# Patient Record
Sex: FEMALE | Race: BLACK OR AFRICAN AMERICAN
[De-identification: names, ages, dates, MRNs, and addresses within clinical notes are randomized per-mention and may not be internally consistent; named-entity substitution may affect disease eponyms.]

---

## 2018-04-25 ENCOUNTER — RESULT REVIEW (OUTPATIENT)
Age: 33
End: 2018-04-25

## 2018-11-14 ENCOUNTER — EMERGENCY (EMERGENCY)
Facility: HOSPITAL | Age: 33
LOS: 1 days | Discharge: HOME | End: 2018-11-14
Attending: EMERGENCY MEDICINE | Admitting: EMERGENCY MEDICINE
Payer: SELF-PAY

## 2018-11-14 VITALS
SYSTOLIC BLOOD PRESSURE: 132 MMHG | OXYGEN SATURATION: 99 % | DIASTOLIC BLOOD PRESSURE: 83 MMHG | HEART RATE: 81 BPM | RESPIRATION RATE: 16 BRPM | TEMPERATURE: 99 F

## 2018-11-14 VITALS
SYSTOLIC BLOOD PRESSURE: 128 MMHG | DIASTOLIC BLOOD PRESSURE: 73 MMHG | TEMPERATURE: 99 F | RESPIRATION RATE: 18 BRPM | HEART RATE: 95 BPM | OXYGEN SATURATION: 100 %

## 2018-11-14 DIAGNOSIS — Z98.890 OTHER SPECIFIED POSTPROCEDURAL STATES: Chronic | ICD-10-CM

## 2018-11-14 LAB
ALBUMIN SERPL ELPH-MCNC: 4.6 G/DL — SIGNIFICANT CHANGE UP (ref 3.3–5)
ALP SERPL-CCNC: 70 U/L — SIGNIFICANT CHANGE UP (ref 40–120)
ALT FLD-CCNC: 13 U/L — SIGNIFICANT CHANGE UP (ref 4–33)
APTT BLD: 31.7 SEC — SIGNIFICANT CHANGE UP (ref 27.5–36.3)
AST SERPL-CCNC: 17 U/L — SIGNIFICANT CHANGE UP (ref 4–32)
BASOPHILS # BLD AUTO: 0.01 K/UL — SIGNIFICANT CHANGE UP (ref 0–0.2)
BASOPHILS NFR BLD AUTO: 0.2 % — SIGNIFICANT CHANGE UP (ref 0–2)
BILIRUB SERPL-MCNC: 0.4 MG/DL — SIGNIFICANT CHANGE UP (ref 0.2–1.2)
BLD GP AB SCN SERPL QL: NEGATIVE — SIGNIFICANT CHANGE UP
BUN SERPL-MCNC: 12 MG/DL — SIGNIFICANT CHANGE UP (ref 7–23)
CALCIUM SERPL-MCNC: 9.8 MG/DL — SIGNIFICANT CHANGE UP (ref 8.4–10.5)
CHLORIDE SERPL-SCNC: 102 MMOL/L — SIGNIFICANT CHANGE UP (ref 98–107)
CO2 SERPL-SCNC: 25 MMOL/L — SIGNIFICANT CHANGE UP (ref 22–31)
CREAT SERPL-MCNC: 0.84 MG/DL — SIGNIFICANT CHANGE UP (ref 0.5–1.3)
EOSINOPHIL # BLD AUTO: 0.12 K/UL — SIGNIFICANT CHANGE UP (ref 0–0.5)
EOSINOPHIL NFR BLD AUTO: 2.1 % — SIGNIFICANT CHANGE UP (ref 0–6)
GLUCOSE SERPL-MCNC: 93 MG/DL — SIGNIFICANT CHANGE UP (ref 70–99)
HCG SERPL-ACNC: 245.8 MIU/ML — SIGNIFICANT CHANGE UP
HCT VFR BLD CALC: 41.1 % — SIGNIFICANT CHANGE UP (ref 34.5–45)
HGB BLD-MCNC: 13.7 G/DL — SIGNIFICANT CHANGE UP (ref 11.5–15.5)
IMM GRANULOCYTES # BLD AUTO: 0.01 # — SIGNIFICANT CHANGE UP
IMM GRANULOCYTES NFR BLD AUTO: 0.2 % — SIGNIFICANT CHANGE UP (ref 0–1.5)
INR BLD: 1 — SIGNIFICANT CHANGE UP (ref 0.88–1.17)
LYMPHOCYTES # BLD AUTO: 3.09 K/UL — SIGNIFICANT CHANGE UP (ref 1–3.3)
LYMPHOCYTES # BLD AUTO: 54.1 % — HIGH (ref 13–44)
MCHC RBC-ENTMCNC: 29.2 PG — SIGNIFICANT CHANGE UP (ref 27–34)
MCHC RBC-ENTMCNC: 33.3 % — SIGNIFICANT CHANGE UP (ref 32–36)
MCV RBC AUTO: 87.6 FL — SIGNIFICANT CHANGE UP (ref 80–100)
MONOCYTES # BLD AUTO: 0.43 K/UL — SIGNIFICANT CHANGE UP (ref 0–0.9)
MONOCYTES NFR BLD AUTO: 7.5 % — SIGNIFICANT CHANGE UP (ref 2–14)
NEUTROPHILS # BLD AUTO: 2.05 K/UL — SIGNIFICANT CHANGE UP (ref 1.8–7.4)
NEUTROPHILS NFR BLD AUTO: 35.9 % — LOW (ref 43–77)
NRBC # FLD: 0 — SIGNIFICANT CHANGE UP
PLATELET # BLD AUTO: 435 K/UL — HIGH (ref 150–400)
PMV BLD: 11.1 FL — SIGNIFICANT CHANGE UP (ref 7–13)
POTASSIUM SERPL-MCNC: 3.6 MMOL/L — SIGNIFICANT CHANGE UP (ref 3.5–5.3)
POTASSIUM SERPL-SCNC: 3.6 MMOL/L — SIGNIFICANT CHANGE UP (ref 3.5–5.3)
PROT SERPL-MCNC: 8.2 G/DL — SIGNIFICANT CHANGE UP (ref 6–8.3)
PROTHROM AB SERPL-ACNC: 11.1 SEC — SIGNIFICANT CHANGE UP (ref 9.8–13.1)
RBC # BLD: 4.69 M/UL — SIGNIFICANT CHANGE UP (ref 3.8–5.2)
RBC # FLD: 13.2 % — SIGNIFICANT CHANGE UP (ref 10.3–14.5)
RH IG SCN BLD-IMP: POSITIVE — SIGNIFICANT CHANGE UP
RH IG SCN BLD-IMP: POSITIVE — SIGNIFICANT CHANGE UP
SODIUM SERPL-SCNC: 141 MMOL/L — SIGNIFICANT CHANGE UP (ref 135–145)
WBC # BLD: 5.71 K/UL — SIGNIFICANT CHANGE UP (ref 3.8–10.5)
WBC # FLD AUTO: 5.71 K/UL — SIGNIFICANT CHANGE UP (ref 3.8–10.5)

## 2018-11-14 PROCEDURE — 76830 TRANSVAGINAL US NON-OB: CPT | Mod: 26

## 2018-11-14 PROCEDURE — 99284 EMERGENCY DEPT VISIT MOD MDM: CPT | Mod: 25

## 2018-11-14 PROCEDURE — 99283 EMERGENCY DEPT VISIT LOW MDM: CPT

## 2018-11-14 PROCEDURE — 76817 TRANSVAGINAL US OBSTETRIC: CPT | Mod: 26

## 2018-11-14 RX ORDER — METHOTREXATE 2.5 MG/1
96.5 TABLET ORAL ONCE
Refills: 0 | Status: COMPLETED | OUTPATIENT
Start: 2018-11-14 | End: 2018-11-14

## 2018-11-14 NOTE — ED PROVIDER NOTE - PROGRESS NOTE DETAILS
Kasia: Pt received as signout. Pending US and OB decision regarding whether to operate or administer mtx. Per radiology ectopic is actually on left side. OB paged to clarify. Kasia: GYN saw pt. They recommend mtx and ordered it. Pt now awaiting pharmacy to send mtx. Pt will then go home with GYN followup. Kasia: pt given mtx. Requesting to go home. Feels better.

## 2018-11-14 NOTE — ED ADULT NURSE NOTE - OBJECTIVE STATEMENT
Pt A&Ox3. 7 weeks pregnant, sent in by OB-GYN for confirmed known left ectopic pregnancy. Pt complaining of intermittent LLQ pain x5days. Pt reports spotting when wiping. No active vaginal bleeding noted at this time. Respirations equal nonlabored, no sign of respiratory distress. Abdomen soft, nondistended, tender to touch in left lower quadrant. Breath sounds clear bilaterally. Denies chest pain, SOB, N/V/D, dizziness, fevers. Last LMP 09/25/18.  at bedside, safety maintained will reassess

## 2018-11-14 NOTE — ED PROVIDER NOTE - CARE PLAN
Principal Discharge DX:	Ectopic pregnancy  Assessment and plan of treatment:	The patient was given verbal and written discharge instructions. Specifically, instructions when to return to the ED and when to seek follow-up from their pcp was discussed. Any specialty follow-up was discussed, including how to make an appointment.  Instructions were discussed in simple, plain language and was understood by the patient. The patient understands that should their symptoms worsen or any new symptoms arise, they should return to the ED immediately for further evaluation.

## 2018-11-14 NOTE — CONSULT NOTE ADULT - ATTENDING COMMENTS
Agree with above  Pt seen and examined  Pt with no complaints  VSS, afebrile    PE -   Ab - soft/nt/nd/bs+/no rebound or guarding    BHCG 245 today and was 261 last week on Wed at [facility] in [place] (pt has her paperwork)    Sono with possible left ecoptic pregnancy 2 cm/no FF    A/P Pt with abnormal pregnancy and possible ectopic.  Pt in stable condition.    Will  1) MTX  2) PMD Gyn notified (Gamal) by myself  3) Pt to F/U with Dr. Yeung for BHCG on Day 4 and 7    VICKI COLON THIS IS A LATE ENTRY NOTE

## 2018-11-14 NOTE — ED PROCEDURE NOTE - PROCEDURE ADDITIONAL DETAILS
Uterus: Is homogenous in appearnace with a nabothian cyst. No IUP.    Endometrium: 5 mm.     Right ovary: 2.34 cm x 2.41 cm.  There is a 1.62 cm x 1.43 cm echogenic structure adjacent to the ovary.     Left ovary: 2.63 cm x 3.44 cm.  Left simple ovarian cyst measuring 2cm.     Fluid: None.    IMPRESSION:    No IUP visualized. Findings represent pregnancy   of unknown location and viability. Rounded echogenic 1.62 cm x 1.43 cm structure adjacent to   the right ovary concerned for ectopic pregnancy.

## 2018-11-14 NOTE — ED ADULT NURSE NOTE - NSIMPLEMENTINTERV_GEN_ALL_ED
Implemented All Universal Safety Interventions:  [place] to call system. Call bell, personal items and telephone within reach. Instruct patient to call for assistance. Room bathroom lighting operational. Non-slip footwear when patient is off stretcher. Physically safe environment: no spills, clutter or unnecessary equipment. Stretcher in lowest position, wheels locked, appropriate side rails in place.

## 2018-11-14 NOTE — ED PROVIDER NOTE - OBJECTIVE STATEMENT
33F  at 7 weeks gestation sent by OBGYN for ectopic pregnancy to L ovary diagnosed in office today after presenting to office for L adnexal pain. LMP 18. Had 1 chemical pregnancy, 2 D&Cs for fetal demise. Pain currently not present, comes in waves. No chest pain/SOB/syncope/lightheadedness. Last week had some vaginal bleeding but resolved.   Last PO intake 1 hour ago (bob).    OBGYN: Dr. Elliott (works with Dr. Alvina Yeung) 33F  at 7 weeks gestation sent by OBGYN for ectopic pregnancy to R ovary diagnosed in office today after presenting to office for R adnexal pain. LMP 18. Had 1 chemical pregnancy, 2 D&Cs for fetal demise. Pain currently not present, comes in waves. No chest pain/SOB/syncope/lightheadedness. Last week had some vaginal bleeding but resolved.   Last PO intake 1 hour ago (bob).    OBGYN: Dr. Elliott (works with Dr. Alvina Yeung) 33F  at 7 weeks gestation sent by OBGYN for ectopic pregnancy to R ovary diagnosed in office today after presenting to office for R adnexal pain. LMP 18. Had 1 chemical pregnancy, 2 D&Cs for fetal demise. Pain currently not present, comes in waves. No chest pain/SOB/syncope/lightheadedness. Last week had some vaginal bleeding but resolved.   Last PO intake 1 hour ago (bob)..    OBGYN: Dr. Elliott (works with Dr. Alvina Yeung)

## 2018-11-14 NOTE — ED PROVIDER NOTE - NSFOLLOWUPINSTRUCTIONS_ED_ALL_ED_FT
Follow up with your gynecologist.  Return for repeat beta levels on Saturday and next Tuesday.  Return to ED if you experience increased bleeding, abdominal pain, lighthheadedness, or dizziness.

## 2018-11-14 NOTE — ED PROVIDER NOTE - MEDICAL DECISION MAKING DETAILS
33F with R sided ovarian ectopic pregnancy, sent for likely surgical management. Well appearing and hemodynamically stable. Plan for bedside US eval free fluid, cbc, cmp, hcg, type and screen, coags, obgyn eval.

## 2018-11-14 NOTE — ED ADULT TRIAGE NOTE - CHIEF COMPLAINT QUOTE
Pt sent in by GYN, currently undergoing fertility therapy and was having LLQ abd painx5 days. Pt seen by GYN and had 4D Scan done, found to have + ectopic pregnancy in L ovary. Having spotting today. LMP 9/25/18. Hx: PCOS

## 2018-11-14 NOTE — ED PROVIDER NOTE - ATTENDING CONTRIBUTION TO CARE
Attending note:   After face to face evaluation of this patient, I concur with above noted hx, pe, and care plan for this patient.  32 y/o F with documented left ectopic pregnancy sent to ED for surgery.  No dizziness, vss.   evaluation in progress

## 2018-11-14 NOTE — CONSULT NOTE ADULT - ASSESSMENT
A/P: 33y  LMP 18 who presents with pregnancy of unknown location and a plateauing beta, stable.  - patient counseled on the fact that pregnancy is not normal, not seen in uterus, and could be ectopic  - patient counseled on surgical management vs expectant mgmt vs methotrexate; pt requesting methotrexate  - pt w no contraindications for mtx treatment  - pt counseled regarding mtx therapy, and need for day 4 (Saturday) and day 7 (Tuesday) beta levels, and possible need for another dose vs surgical mgmt if mtx does not work  - pt counseled to return to ED if bleeding, abd pain, lightheadedness, dizziness  - methotrexate consent forms obtained, ordered  - pt will have close follow-up for beta levels    Pt seen and d/w Dr. Nohemi Duff, PGY2

## 2018-11-14 NOTE — CONSULT NOTE ADULT - SUBJECTIVE AND OBJECTIVE BOX
R2 GYN CONSULT NOTE    33y  LMP 18 sent from OB office for management of R adnexal ectopic pregnancy.        OB/GYN HISTORY:     Surgical History:    S/P LEEP (loop electrosurgical excision procedure)  S/P D&C (status post dilation and curettage)      Past Medical History:   PCOS (polycystic ovarian syndrome)      NSAIDs (Anaphylaxis)          FAMILY HISTORY:      Social History:     REVIEW OF SYSTEMS:    CONSTITUTIONAL: No fever, weight loss, or fatigue  EYES: No eye pain, visual disturbances, or discharge  ENMT:  No difficulty hearing, tinnitus, vertigo; No sinus or throat pain  NECK: No pain or stiffness  BREASTS: No pain, masses, or nipple discharge  RESPIRATORY: No cough, wheezing, chills or hemoptysis; No shortness of breath  CARDIOVASCULAR: No chest pain, palpitations, dizziness, or leg swelling  GASTROINTESTINAL: No abdominal or epigastric pain. No nausea, vomiting, or hematemesis; No diarrhea or constipation. No melena or hematochezia.  GENITOURINARY: No dysuria, frequency, hematuria, or incontinence  NEUROLOGICAL: No headaches, memory loss, loss of strength, numbness, or tremors  SKIN: No itching, burning, rashes, or lesions   LYMPH NODES: No enlarged glands  ENDOCRINE: No heat or cold intolerance; No hair loss  MUSCULOSKELETAL: No joint pain or swelling; No muscle, back, or extremity pain  PSYCHIATRIC: No depression, anxiety, mood swings, or difficulty sleeping  HEME/LYMPH: No easy bruising, or bleeding gums  ALLERY AND IMMUNOLOGIC: No hives or eczema      MEDICATIONS  (STANDING):    MEDICATIONS  (PRN):      OBJECTIVE FINDINGS:    Vital Signs Last 24 Hrs  T(C): 36.7 (2018 18:13), Max: 37.3 (2018 16:02)  T(F): 98 (2018 18:13), Max: 99.2 (2018 16:02)  HR: 90 (2018 18:13) (90 - 95)  BP: 139/87 (2018 18:13) (128/73 - 139/87)  BP(mean): --  RR: 18 (2018 18:13) (18 - 18)  SpO2: 100% (2018 18:13) (100% - 100%)    S/P LEEP (loop electrosurgical excision procedure)  S/P D&C (status post dilation and curettage)      PE:  Gen: Comfortable, NAD  CV: RRR  Pulm: CTAB  Abd: Soft, NT  Ext: No edema or tenderness bilaterally  Spec Exam:    LABS:                        13.7   5.71  )-----------( 435      ( 2018 17:41 )             41.1     -    141  |  102  |  12  ----------------------------<  93  3.6   |  25  |  0.84    Ca    9.8      2018 17:41    TPro  8.2  /  Alb  4.6  /  TBili  0.4  /  DBili  x   /  AST  17  /  ALT  13  /  AlkPhos  70  11-14    PT/INR - ( 2018 17:41 )   PT: 11.1 SEC;   INR: 1.00          PTT - ( 2018 17:41 )  PTT:31.7 SEC    PCOS (polycystic ovarian syndrome)      RADIOLOGY & ADDITIONAL STUDIES:      A/P: 33y G P   who presents with  -  -      KALI Duff, PGY2 R2 GYN CONSULT NOTE    33y  LMP 18 sent from OB office for management of ectopic pregnancy.  Pt  found out was pregnant last week, called OB office, had initial prenatal appointment made for today.   went to the ED on Thursday for pain ([employer]), had a beta hCG (261) and a TVUS that did not show a pregnancy anywhere.  Went to OB office today (Dr. Dara Elliott), bedside ultrasound showed something in the R ovary, was sent for official ultrasound.  Outpt ultrasound revealed R ovarian ectopic, was told to come to ED.    Pt  had vaginal bleeding w clots last week from Wednesday to Saturday, and since has been having dark brown spotting, is not currently bleeding now.    Denies nausea, vomiting, fevers, chills, SOB, CP, dizziness, lightheadedness, urinary complaints, constipation, diarrhea.   has some abdominal tenderness to palpation, has not needed any medication for pain.    Of note, pt  has been taking clomiphene citrate since Monday for fertility treatments.      OB/GYN HISTORY: D&Cx2 for MAB, once in her 20s and once in 2018  SAB in 2018  denies fibroids, STIs, has PCOS, states had an abnl pap smear, then LEEP () which was normal, normal paps since then    Surgical History:    S/P LEEP (loop electrosurgical excision procedure)  S/P D&C (status post dilation and curettage)    Past Medical History:   PCOS (polycystic ovarian syndrome)    Social: social drinker, denies smoking, drugs    Allergies: ASA, NSAIDs (Anaphylaxis)    REVIEW OF SYSTEMS: see HPI, otherwise negative    OBJECTIVE FINDINGS:    Vital Signs Last 24 Hrs  T(C): 36.7 (2018 18:13), Max: 37.3 (2018 16:02)  T(F): 98 (2018 18:13), Max: 99.2 (2018 16:02)  HR: 90 (2018 18:13) (90 - 95)  BP: 139/87 (2018 18:13) (128/73 - 139/87)  RR: 18 (2018 18:13) (18 - 18)  SpO2: 100% (2018 18:13) (100% - 100%)      PE:  Gen: Comfortable, NAD  CV: RRR  Pulm: CTAB  Abd: Soft, NT  Ext: No edema or tenderness bilaterally  Bimanual: no vaginal bleeding noted, mild uterine tenderness, adnexal non-palpable and non tender bilaterally    LABS:                        13.7   5.71  )-----------( 435      ( 2018 17:41 )             41.1         141  |  102  |  12  ----------------------------<  93  3.6   |  25  |  0.84    Ca    9.8      2018 17:41    TPro  8.2  /  Alb  4.6  /  TBili  0.4  /  DBili  x   /  AST  17  /  ALT  13  /  AlkPhos  70      PT/INR - ( 2018 17:41 )   PT: 11.1 SEC;   INR: 1.00          PTT - ( 2018 17:41 )  PTT:31.7 SEC      RADIOLOGY & ADDITIONAL STUDIES:  < from: US Transvaginal (18 @ 20:32) >    EXAM:  US TRANSVAGINAL      PROCEDURE DATE:  2018    INTERPRETATION:  CLINICAL INFORMATION: Right lower quadrant abdominal   pain.  Serum beta-hCG level of 245.    LMP: 2018 corresponding to 7 weeks 1 day.    COMPARISON: None available.    TECHNIQUE:     Endovaginal and transabdominal pelvic sonogram. Color and Spectral   Doppler was performed.    FINDINGS:    Uterus: 8.3 x 3.3 x 4.2 cm. 6 x 4 x 7 mm nabothian cyst. No IUP.    Endometrium: 5 mm.     Right ovary: 3.9 x 2.3 x 3.3 cm. 1 cm intraovarian cystic structure with   thick echogenic wall likely represents an involuting corpus luteum.      Left ovary: 4.4 x 2.6 x 4.6 cm. 2.3 cm cyst. Flow is preserved. There is   a 2.0 x 1.6 x 1.9 cm echogenic structure adjacent to the ovary    Fluid: None.    IMPRESSION:    No intrauterine gestational sac identified. Findings represent pregnancy   of unknown location and viability.  Rounded 2 cm structure adjacent to   the left ovary may represent a left tubal ectopic pregnancy.    No free fluid.    Dr. Chan discussed these findings with Dr. Pedroza on 2018 9:18   PM with read back.    EVELYN CHAN M.D., RADIOLOGY RESIDENT  This document has been electronically signed.  NASIM BLANKENSHIP M.D., ATTENDING RADIOLOGIST  This document has been electronically signed. 2018  9:36PM        < end of copied text >

## 2018-11-15 RX ADMIN — METHOTREXATE 96.5 MILLIGRAM(S): 2.5 TABLET ORAL at 00:52

## 2019-02-21 ENCOUNTER — RESULT REVIEW (OUTPATIENT)
Age: 34
End: 2019-02-21

## 2019-03-19 PROBLEM — Z00.00 ENCOUNTER FOR PREVENTIVE HEALTH EXAMINATION: Status: ACTIVE | Noted: 2019-03-19

## 2019-03-27 ENCOUNTER — APPOINTMENT (OUTPATIENT)
Dept: ANTEPARTUM | Facility: CLINIC | Age: 34
End: 2019-03-27

## 2019-09-19 ENCOUNTER — OUTPATIENT (OUTPATIENT)
Dept: INPATIENT UNIT | Facility: HOSPITAL | Age: 34
LOS: 1 days | Discharge: HOME | End: 2019-09-19
Payer: COMMERCIAL

## 2019-09-19 VITALS
TEMPERATURE: 99 F | SYSTOLIC BLOOD PRESSURE: 124 MMHG | OXYGEN SATURATION: 100 % | RESPIRATION RATE: 16 BRPM | HEART RATE: 94 BPM | DIASTOLIC BLOOD PRESSURE: 78 MMHG

## 2019-09-19 VITALS — SYSTOLIC BLOOD PRESSURE: 105 MMHG | DIASTOLIC BLOOD PRESSURE: 70 MMHG | HEART RATE: 78 BPM

## 2019-09-19 DIAGNOSIS — O26.899 OTHER SPECIFIED PREGNANCY RELATED CONDITIONS, UNSPECIFIED TRIMESTER: ICD-10-CM

## 2019-09-19 DIAGNOSIS — Z3A.00 WEEKS OF GESTATION OF PREGNANCY NOT SPECIFIED: ICD-10-CM

## 2019-09-19 DIAGNOSIS — Z98.890 OTHER SPECIFIED POSTPROCEDURAL STATES: Chronic | ICD-10-CM

## 2019-09-19 LAB
ALBUMIN SERPL ELPH-MCNC: 3.8 G/DL — SIGNIFICANT CHANGE UP (ref 3.3–5)
ALP SERPL-CCNC: 170 U/L — HIGH (ref 40–120)
ALT FLD-CCNC: 11 U/L — SIGNIFICANT CHANGE UP (ref 4–33)
ANION GAP SERPL CALC-SCNC: 14 MMO/L — SIGNIFICANT CHANGE UP (ref 7–14)
APPEARANCE UR: SIGNIFICANT CHANGE UP
APTT BLD: 26.7 SEC — LOW (ref 27.5–36.3)
AST SERPL-CCNC: 14 U/L — SIGNIFICANT CHANGE UP (ref 4–32)
BACTERIA # UR AUTO: SIGNIFICANT CHANGE UP
BASOPHILS # BLD AUTO: 0.01 K/UL — SIGNIFICANT CHANGE UP (ref 0–0.2)
BASOPHILS NFR BLD AUTO: 0.1 % — SIGNIFICANT CHANGE UP (ref 0–2)
BILIRUB SERPL-MCNC: 0.3 MG/DL — SIGNIFICANT CHANGE UP (ref 0.2–1.2)
BILIRUB UR-MCNC: NEGATIVE — SIGNIFICANT CHANGE UP
BLOOD UR QL VISUAL: NEGATIVE — SIGNIFICANT CHANGE UP
BUN SERPL-MCNC: 9 MG/DL — SIGNIFICANT CHANGE UP (ref 7–23)
CALCIUM SERPL-MCNC: 9.7 MG/DL — SIGNIFICANT CHANGE UP (ref 8.4–10.5)
CHLORIDE SERPL-SCNC: 103 MMOL/L — SIGNIFICANT CHANGE UP (ref 98–107)
CO2 SERPL-SCNC: 19 MMOL/L — LOW (ref 22–31)
COLOR SPEC: SIGNIFICANT CHANGE UP
CREAT ?TM UR-MCNC: 72.4 MG/DL — SIGNIFICANT CHANGE UP
CREAT SERPL-MCNC: 0.64 MG/DL — SIGNIFICANT CHANGE UP (ref 0.5–1.3)
EOSINOPHIL # BLD AUTO: 0.05 K/UL — SIGNIFICANT CHANGE UP (ref 0–0.5)
EOSINOPHIL NFR BLD AUTO: 0.7 % — SIGNIFICANT CHANGE UP (ref 0–6)
FIBRINOGEN PPP-MCNC: 773 MG/DL — HIGH (ref 350–510)
GLUCOSE SERPL-MCNC: 101 MG/DL — HIGH (ref 70–99)
GLUCOSE UR-MCNC: NEGATIVE — SIGNIFICANT CHANGE UP
HCT VFR BLD CALC: 38.8 % — SIGNIFICANT CHANGE UP (ref 34.5–45)
HGB BLD-MCNC: 13 G/DL — SIGNIFICANT CHANGE UP (ref 11.5–15.5)
HYALINE CASTS # UR AUTO: NEGATIVE — SIGNIFICANT CHANGE UP
IMM GRANULOCYTES NFR BLD AUTO: 0.8 % — SIGNIFICANT CHANGE UP (ref 0–1.5)
INR BLD: 0.96 — SIGNIFICANT CHANGE UP (ref 0.88–1.17)
KETONES UR-MCNC: NEGATIVE — SIGNIFICANT CHANGE UP
LDH SERPL L TO P-CCNC: 138 U/L — SIGNIFICANT CHANGE UP (ref 135–225)
LEUKOCYTE ESTERASE UR-ACNC: NEGATIVE — SIGNIFICANT CHANGE UP
LYMPHOCYTES # BLD AUTO: 2.19 K/UL — SIGNIFICANT CHANGE UP (ref 1–3.3)
LYMPHOCYTES # BLD AUTO: 31 % — SIGNIFICANT CHANGE UP (ref 13–44)
MCHC RBC-ENTMCNC: 29 PG — SIGNIFICANT CHANGE UP (ref 27–34)
MCHC RBC-ENTMCNC: 33.5 % — SIGNIFICANT CHANGE UP (ref 32–36)
MCV RBC AUTO: 86.6 FL — SIGNIFICANT CHANGE UP (ref 80–100)
MONOCYTES # BLD AUTO: 0.71 K/UL — SIGNIFICANT CHANGE UP (ref 0–0.9)
MONOCYTES NFR BLD AUTO: 10.1 % — SIGNIFICANT CHANGE UP (ref 2–14)
NEUTROPHILS # BLD AUTO: 4.04 K/UL — SIGNIFICANT CHANGE UP (ref 1.8–7.4)
NEUTROPHILS NFR BLD AUTO: 57.3 % — SIGNIFICANT CHANGE UP (ref 43–77)
NITRITE UR-MCNC: NEGATIVE — SIGNIFICANT CHANGE UP
NRBC # FLD: 0 K/UL — SIGNIFICANT CHANGE UP (ref 0–0)
PH UR: 6 — SIGNIFICANT CHANGE UP (ref 5–8)
PLATELET # BLD AUTO: 226 K/UL — SIGNIFICANT CHANGE UP (ref 150–400)
PMV BLD: 11.8 FL — SIGNIFICANT CHANGE UP (ref 7–13)
POTASSIUM SERPL-MCNC: 3.8 MMOL/L — SIGNIFICANT CHANGE UP (ref 3.5–5.3)
POTASSIUM SERPL-SCNC: 3.8 MMOL/L — SIGNIFICANT CHANGE UP (ref 3.5–5.3)
PROT SERPL-MCNC: 7.4 G/DL — SIGNIFICANT CHANGE UP (ref 6–8.3)
PROT UR-MCNC: 10.7 MG/DL — SIGNIFICANT CHANGE UP
PROT UR-MCNC: NEGATIVE — SIGNIFICANT CHANGE UP
PROTHROM AB SERPL-ACNC: 10.9 SEC — SIGNIFICANT CHANGE UP (ref 9.8–13.1)
RBC # BLD: 4.48 M/UL — SIGNIFICANT CHANGE UP (ref 3.8–5.2)
RBC # FLD: 14 % — SIGNIFICANT CHANGE UP (ref 10.3–14.5)
RBC CASTS # UR COMP ASSIST: SIGNIFICANT CHANGE UP (ref 0–?)
SODIUM SERPL-SCNC: 136 MMOL/L — SIGNIFICANT CHANGE UP (ref 135–145)
SP GR SPEC: 1.01 — SIGNIFICANT CHANGE UP (ref 1–1.04)
SQUAMOUS # UR AUTO: SIGNIFICANT CHANGE UP
URATE SERPL-MCNC: 4.2 MG/DL — SIGNIFICANT CHANGE UP (ref 2.5–7)
UROBILINOGEN FLD QL: NORMAL — SIGNIFICANT CHANGE UP
WBC # BLD: 7.06 K/UL — SIGNIFICANT CHANGE UP (ref 3.8–10.5)
WBC # FLD AUTO: 7.06 K/UL — SIGNIFICANT CHANGE UP (ref 3.8–10.5)
WBC UR QL: SIGNIFICANT CHANGE UP (ref 0–?)

## 2019-09-19 PROCEDURE — 99213 OFFICE O/P EST LOW 20 MIN: CPT

## 2019-09-19 NOTE — OB PROVIDER TRIAGE NOTE - NSHPPHYSICALEXAM_GEN_ALL_CORE
33 yo ALESHIA  @ 39 wks comes in c/o needing an evaluation as pt had 1 blood pressure of 140/80 in office today. pt denies lof, vb, or contractions. reports +GFM. pt reports has had a ha every morning x 1 week very light. denies fever, chills, dizzy, n/v/d, blurry vision, visual spots or dysuria.    GBS: negative    abd: soft, gravid, NT  FHT: + accels negative decels moderate variability  toco: none     meds: PNV, folic acid  all: NSAIDS, antiinflammatories    PMH: PCOS on metformin til 12 wks  PSH: D&C x 2 ,   gyn hx: leep procedure   ob hx: ectopic , chemical , SAB , SAB  &  35 yo ALESHIA  @ 39 wks comes in c/o needing an evaluation as pt had 1 blood pressure of 140/80 in office today. pt denies lof, vb, or contractions. reports +GFM. pt reports has had a ha every morning x 1 week very light. denies fever, chills, dizzy, n/v/d, blurry vision, visual spots or dysuria.    GBS: negative    abd: soft, gravid, NT  FHT: + accels negative decels moderate variability  toco: none   Vital Signs Last 24 Hrs  T(C): 37 (19 Sep 2019 17:00), Max: 37 (19 Sep 2019 17:00)  T(F): 98.6 (19 Sep 2019 17:00), Max: 98.6 (19 Sep 2019 17:00)  HR: 78 (19 Sep 2019 18:35) (78 - 94)  BP: 105/70 (19 Sep 2019 18:35) (105/70 - 124/78)  BP(mean): --  RR: 16 (19 Sep 2019 17:00) (16 - 16)  SpO2: 100% (19 Sep 2019 17:00) (100% - 100%)vital    meds: PNV, folic acid  all: NSAIDS, antiinflammatories    PMH: PCOS on metformin til 12 wks  PSH: D&C x 2 ,   gyn hx: leep procedure   ob hx: ectopic 2018, chemical , SAB , SAB  &

## 2019-09-19 NOTE — OB PROVIDER TRIAGE NOTE - HISTORY OF PRESENT ILLNESS
35 yo ALESHIA  @ 39 wks comes in c/o needing an evaluation as pt had 1 blood pressure of 140/80 in office today. pt denies lof, vb, or contractions. reports +GFM. pt reports has had a ha every morning x 1 week very light. denies fever, chills, dizzy, n/v/d, blurry vision, visual spots.    GBS: negative    meds: PNV, folic acid  all: NSAIDS, antiinflammatories    PMH: PCOS on metformin til 12 wks  PSH: D&C x 2 ,   gyn hx: leep procedure   ob hx: ectopic , chemical , SAB , SAB  &

## 2019-09-19 NOTE — OB PROVIDER TRIAGE NOTE - NSOBPROVIDERNOTE_OBGYN_ALL_OB_FT
NST  labs  monitor blood pressures 35 yo ALESHIA  @ 39 wks comes in c/o needing an evaluation as pt had 1 blood pressure of 140/80 in office today. pt denies lof, vb, or contractions. reports +GFM. pt reports has had a ha every morning x 1 week very light. denies fever, chills, dizzy, n/v/d, blurry vision, visual spots or dysuria.    GBS: negative    abd: soft, gravid, NT  FHT: + accels negative decels moderate variability  toco: none   Vital Signs Last 24 Hrs  T(C): 37 (19 Sep 2019 17:00), Max: 37 (19 Sep 2019 17:00)  T(F): 98.6 (19 Sep 2019 17:00), Max: 98.6 (19 Sep 2019 17:00)  HR: 78 (19 Sep 2019 18:35) (78 - 94)  BP: 105/70 (19 Sep 2019 18:35) (105/70 - 124/78)  BP(mean): --  RR: 16 (19 Sep 2019 17:00) (16 - 16)  SpO2: 100% (19 Sep 2019 17:00) (100% - 100%)vital    meds: PNV, folic acid  all: NSAIDS, antiinflammatories    PMH: PCOS on metformin til 12 wks  PSH: D&C x 2 ,   gyn hx: leep procedure   ob hx: ectopic , chemical , SAB , SAB  &   plan:  Cat 1 FHT  labs wnl  blood pressures stable  d/w Dr Schmidt discharge home at 39 wks no evidence of PEC  discharge home  maternal and fetal surveillance reassuring  call Dr Johnson office tonight to find out when he would like you to come in for a blood pressure recheck  rest activity as tolerated  increase water intake  call md or D&T for vaginal bleeding, leakage of fluid or decreased fetal movement or any concerns  labor precautions instructed  continue kick counts  v/w discharge instructions given to pt with understanding of d/c by patient  discharged home

## 2019-09-19 NOTE — OB PROVIDER TRIAGE NOTE - ADDITIONAL INSTRUCTIONS
maternal and fetal surveillance reassuring  call Dr Johnson office tonight to find out when he would like you to come in for a blood pressure recheck  rest activity as tolerated  increase water intake  call md or D&T for vaginal bleeding, leakage of fluid or decreased fetal movement or any concerns  labor precautions instructed  continue kick counts  v/w discharge instructions given to pt with understanding of d/c by patient  discharged home

## 2019-09-26 ENCOUNTER — OUTPATIENT (OUTPATIENT)
Dept: INPATIENT UNIT | Facility: HOSPITAL | Age: 34
LOS: 1 days | Discharge: HOME | End: 2019-09-26
Payer: COMMERCIAL

## 2019-09-26 ENCOUNTER — OUTPATIENT (OUTPATIENT)
Dept: OUTPATIENT SERVICES | Facility: HOSPITAL | Age: 34
LOS: 1 days | Discharge: HOME | End: 2019-09-26

## 2019-09-26 ENCOUNTER — INPATIENT (INPATIENT)
Facility: HOSPITAL | Age: 34
LOS: 4 days | Discharge: HOME | End: 2019-10-01
Attending: OBSTETRICS & GYNECOLOGY | Admitting: OBSTETRICS & GYNECOLOGY

## 2019-09-26 VITALS
HEART RATE: 86 BPM | SYSTOLIC BLOOD PRESSURE: 118 MMHG | RESPIRATION RATE: 16 BRPM | TEMPERATURE: 98 F | DIASTOLIC BLOOD PRESSURE: 82 MMHG

## 2019-09-26 VITALS
DIASTOLIC BLOOD PRESSURE: 75 MMHG | TEMPERATURE: 98 F | SYSTOLIC BLOOD PRESSURE: 114 MMHG | HEART RATE: 87 BPM | RESPIRATION RATE: 20 BRPM | OXYGEN SATURATION: 100 %

## 2019-09-26 VITALS
TEMPERATURE: 98 F | DIASTOLIC BLOOD PRESSURE: 81 MMHG | HEART RATE: 96 BPM | RESPIRATION RATE: 18 BRPM | SYSTOLIC BLOOD PRESSURE: 125 MMHG

## 2019-09-26 VITALS — SYSTOLIC BLOOD PRESSURE: 122 MMHG | HEART RATE: 82 BPM | DIASTOLIC BLOOD PRESSURE: 81 MMHG

## 2019-09-26 VITALS
WEIGHT: 199.08 LBS | DIASTOLIC BLOOD PRESSURE: 81 MMHG | HEART RATE: 96 BPM | SYSTOLIC BLOOD PRESSURE: 125 MMHG | HEIGHT: 66 IN

## 2019-09-26 DIAGNOSIS — Z98.890 OTHER SPECIFIED POSTPROCEDURAL STATES: Chronic | ICD-10-CM

## 2019-09-26 DIAGNOSIS — O26.899 OTHER SPECIFIED PREGNANCY RELATED CONDITIONS, UNSPECIFIED TRIMESTER: ICD-10-CM

## 2019-09-26 DIAGNOSIS — Z3A.00 WEEKS OF GESTATION OF PREGNANCY NOT SPECIFIED: ICD-10-CM

## 2019-09-26 PROCEDURE — 59025 FETAL NON-STRESS TEST: CPT | Mod: 26

## 2019-09-26 NOTE — OB PROVIDER TRIAGE NOTE - NSHPPHYSICALEXAM_GEN_ALL_CORE
Assessment reveals VSS  Abdomen soft, NT, gravid  VE: 0/50/-3      Plan:  NST/BPP Assessment reveals VSS  Abdomen soft, NT, gravid  VE: 0/50/-3      Plan:  NST/BPP    Cat 1 tracing, ctx q8mins  TAS: bpp8/8, vtx, ant placenta, lu:8.22

## 2019-09-26 NOTE — OB RN TRIAGE NOTE - PMH
Ectopic pregnancy    PCOS (polycystic ovarian syndrome)    Spontaneous   sab x 3 with 2 d&c Ectopic pregnancy  2018 right ovary  PCOS (polycystic ovarian syndrome)    Spontaneous   sab x 3 with 2 d&c

## 2019-09-26 NOTE — OB PROVIDER TRIAGE NOTE - NS_OBGYNHISTORY_OBGYN_ALL_OB_FT
-Early Incomplete SAB/D&C @ 5wks  -Early Incomplete SAB/D&C @ 6 wks  2017-Chemical pregnancy.  2018-Ovarian ectopic pregnancy, s/p MTX.   -Chlamydia, s/p treatment  -Abn PAP, S/P LEEP  h/o PCOS (D/c'ed metformin @ 12 weeks)

## 2019-09-26 NOTE — OB PROVIDER TRIAGE NOTE - PLAN OF CARE
Pt cleared for discharge home as per Dr Martínez  Follow-up in the office with Dr Johnson on Monday, 9/30/19 if undelivered.  Continue fetal kick counts  Return to triage for: decreased fetal movement, leakage of fluid, vaginal bleeding, increase in contraction frequency and intensity, or for any other concerns.  Labor/Pre-eclampsia warning signs reviewed with patient. Patient verbalized understanding.

## 2019-09-26 NOTE — OB RN TRIAGE NOTE - PMH
PCOS (polycystic ovarian syndrome)    Spontaneous   sab x 5 Ectopic pregnancy    PCOS (polycystic ovarian syndrome)    Spontaneous   sab x 3 with 2 d&c

## 2019-09-26 NOTE — OB PROVIDER TRIAGE NOTE - HISTORY OF PRESENT ILLNESS
Patient of Dr Johnson  33y/o  @40wks gestation. Conceived on Clomid  returned from ambulating with c/o more painful ctx since 12am, felt q3-4 min. Pain scale 9/10. Pt also with c/o leakage of clear fluid @ 7am.   Pt endorses good fetal movement, no vaginal bleeding. GBS negative per patient.    AP Complications: Denies    Allergies: NSAIDS-anaphylaxsis   Medications: Iron, PNV, Folic Acid  OBGYN    -Early Incomplete SAB/D&C @ 5wks  2017-Early Incomplete SAB/D&C @ 6 wks  2017-Chemical pregnancy.  2018-Right Ovarian ectopic pregnancy, s/p MTX.   -Chlamydia, s/p treatment  -Abn PAP, S/P LEEP  h/o PCOS (D/c'ed metformin @ 12 weeks)  Medical HX: Non-contributory  Surgical HX: LEEP ('), D&C x2 ('', ')  Denies Etoh/Smoke/Drugs/Psy/FH  H/W/BMI: 66"/199#/32.1

## 2019-09-26 NOTE — OB PROVIDER TRIAGE NOTE - HISTORY OF PRESENT ILLNESS
33y/o  @40wks presents with painful ctx since 12am, felt q5-10mins. Pain scale 6/10  Reports good fetal movement  Denies LOF/VB    Allergies: NSAIDS-anaphylaxsis   Medications: Iron, PNV, Folic Acid    Medical HX: PCOS  Surgical HX: LEEP, D&Cs  Denies Etoh/Smoke/Drugs/Psy

## 2019-09-26 NOTE — OB PROVIDER TRIAGE NOTE - ADDITIONAL INSTRUCTIONS
Follow-up in the office with Dr Johnson on Monday, 9/30/19 if undelivered.  Continue fetal kick counts  Return to triage for: decreased fetal movement, leakage of fluid, vaginal bleeding, increase in contraction frequency and intensity, or for any other concerns.

## 2019-09-26 NOTE — OB PROVIDER TRIAGE NOTE - NSOBPROVIDERNOTE_OBGYN_ALL_OB_FT
@ 0838-questionable variable decel noted, and @ 0850 late decel noted. Pt now with Cat 1 tracing x 1.5hrs. 130 bpm, moderate variability, +accels, no decels. [place] 1-2/10 min. Pt states she was able to fall asleep during monitoring.  Dr Mratínez notified of patient's status and above findings  Pt cleared for discharge home as per Dr Martínez  Follow-up in the office with Dr Johnson on Monday, 9/30/19 if undelivered.  Continue fetal kick counts  Return to triage for: decreased fetal movement, leakage of fluid, vaginal bleeding, increase in contraction frequency and intensity, or for any other concerns.  Labor/Pre-eclampsia warning signs reviewed with patient. Patient verbalized understanding.

## 2019-09-26 NOTE — OB PROVIDER TRIAGE NOTE - ADDITIONAL INSTRUCTIONS
Return for decreased fetal movement, loss of fluid or vaginal bleeding  Follow up at next scheduled prenatal visit  Ambulate as directed x2-3 hours return for increased pain  Signs and Symptoms of labor reviewed

## 2019-09-26 NOTE — OB PROVIDER TRIAGE NOTE - PLAN OF CARE
D/C Home  D/W Dr. Johnson  Early Labor   Pt to ambulate x2-3 hours  Return for decreased fetal movement, loss of fluid or vaginal bleeding  Follow up at next scheduled prenatal visit  Ambulate as directed x2-3 hours return for increased pain  Signs and Symptoms of labor reviewed

## 2019-09-26 NOTE — OB PROVIDER TRIAGE NOTE - PMH
Ectopic pregnancy  2018 right ovary  PCOS (polycystic ovarian syndrome)    Spontaneous   sab x 3 with 2 d&c

## 2019-09-26 NOTE — OB PROVIDER TRIAGE NOTE - PSH
S/P D&C (status post dilation and curettage)  2011, 2017  S/P LEEP (loop electrosurgical excision procedure)  2012

## 2019-09-26 NOTE — OB PROVIDER TRIAGE NOTE - NSHPLABSRESULTS_GEN_ALL_CORE
Vital Signs Last 24 Hrs  T(C): 36.9 (26 Sep 2019 08:15), Max: 36.9 (26 Sep 2019 08:06)  T(F): 98.42 (26 Sep 2019 08:15), Max: 98.42 (26 Sep 2019 08:15)  HR: 96 (26 Sep 2019 08:16) (82 - 96)  BP: 125/81 (26 Sep 2019 08:16) (118/82 - 125/81)  BP(mean): --  RR: 18 (26 Sep 2019 08:06) (16 - 18)  SpO2: --

## 2019-09-27 ENCOUNTER — TRANSCRIPTION ENCOUNTER (OUTPATIENT)
Age: 34
End: 2019-09-27

## 2019-09-27 DIAGNOSIS — Z3A.40 40 WEEKS GESTATION OF PREGNANCY: ICD-10-CM

## 2019-09-27 LAB
BASOPHILS # BLD AUTO: 0.02 K/UL — SIGNIFICANT CHANGE UP (ref 0–0.2)
BASOPHILS NFR BLD AUTO: 0.3 % — SIGNIFICANT CHANGE UP (ref 0–2)
BLD GP AB SCN SERPL QL: NEGATIVE — SIGNIFICANT CHANGE UP
EOSINOPHIL # BLD AUTO: 0.03 K/UL — SIGNIFICANT CHANGE UP (ref 0–0.5)
EOSINOPHIL NFR BLD AUTO: 0.4 % — SIGNIFICANT CHANGE UP (ref 0–6)
HCT VFR BLD CALC: 40.3 % — SIGNIFICANT CHANGE UP (ref 34.5–45)
HGB BLD-MCNC: 13.9 G/DL — SIGNIFICANT CHANGE UP (ref 11.5–15.5)
IMM GRANULOCYTES NFR BLD AUTO: 0.8 % — SIGNIFICANT CHANGE UP (ref 0–1.5)
LYMPHOCYTES # BLD AUTO: 1.72 K/UL — SIGNIFICANT CHANGE UP (ref 1–3.3)
LYMPHOCYTES # BLD AUTO: 23.4 % — SIGNIFICANT CHANGE UP (ref 13–44)
MCHC RBC-ENTMCNC: 29.8 PG — SIGNIFICANT CHANGE UP (ref 27–34)
MCHC RBC-ENTMCNC: 34.5 % — SIGNIFICANT CHANGE UP (ref 32–36)
MCV RBC AUTO: 86.3 FL — SIGNIFICANT CHANGE UP (ref 80–100)
MONOCYTES # BLD AUTO: 0.72 K/UL — SIGNIFICANT CHANGE UP (ref 0–0.9)
MONOCYTES NFR BLD AUTO: 9.8 % — SIGNIFICANT CHANGE UP (ref 2–14)
NEUTROPHILS # BLD AUTO: 4.8 K/UL — SIGNIFICANT CHANGE UP (ref 1.8–7.4)
NEUTROPHILS NFR BLD AUTO: 65.3 % — SIGNIFICANT CHANGE UP (ref 43–77)
NRBC # FLD: 0 K/UL — SIGNIFICANT CHANGE UP (ref 0–0)
PLATELET # BLD AUTO: 229 K/UL — SIGNIFICANT CHANGE UP (ref 150–400)
PMV BLD: 11.9 FL — SIGNIFICANT CHANGE UP (ref 7–13)
RBC # BLD: 4.67 M/UL — SIGNIFICANT CHANGE UP (ref 3.8–5.2)
RBC # FLD: 14.1 % — SIGNIFICANT CHANGE UP (ref 10.3–14.5)
RH IG SCN BLD-IMP: POSITIVE — SIGNIFICANT CHANGE UP
WBC # BLD: 7.35 K/UL — SIGNIFICANT CHANGE UP (ref 3.8–10.5)
WBC # FLD AUTO: 7.35 K/UL — SIGNIFICANT CHANGE UP (ref 3.8–10.5)

## 2019-09-27 RX ORDER — SODIUM CHLORIDE 9 MG/ML
300 INJECTION INTRAMUSCULAR; INTRAVENOUS; SUBCUTANEOUS ONCE
Refills: 0 | Status: DISCONTINUED | OUTPATIENT
Start: 2019-09-27 | End: 2019-09-28

## 2019-09-27 RX ORDER — SODIUM CHLORIDE 9 MG/ML
1000 INJECTION, SOLUTION INTRAVENOUS
Refills: 0 | Status: DISCONTINUED | OUTPATIENT
Start: 2019-09-27 | End: 2019-09-27

## 2019-09-27 RX ORDER — OXYTOCIN 10 UNIT/ML
2 VIAL (ML) INJECTION
Qty: 30 | Refills: 0 | Status: DISCONTINUED | OUTPATIENT
Start: 2019-09-27 | End: 2019-09-28

## 2019-09-27 RX ORDER — OXYTOCIN 10 UNIT/ML
333.33 VIAL (ML) INJECTION
Qty: 20 | Refills: 0 | Status: DISCONTINUED | OUTPATIENT
Start: 2019-09-27 | End: 2019-09-27

## 2019-09-27 RX ORDER — OXYTOCIN 10 UNIT/ML
2 VIAL (ML) INJECTION
Qty: 30 | Refills: 0 | Status: DISCONTINUED | OUTPATIENT
Start: 2019-09-27 | End: 2019-09-27

## 2019-09-27 RX ORDER — MORPHINE SULFATE 50 MG/1
4 CAPSULE, EXTENDED RELEASE ORAL ONCE
Refills: 0 | Status: DISCONTINUED | OUTPATIENT
Start: 2019-09-27 | End: 2019-09-27

## 2019-09-27 RX ORDER — OXYTOCIN 10 UNIT/ML
333.33 VIAL (ML) INJECTION
Qty: 20 | Refills: 0 | Status: DISCONTINUED | OUTPATIENT
Start: 2019-09-27 | End: 2019-09-28

## 2019-09-27 RX ORDER — INFLUENZA VIRUS VACCINE 15; 15; 15; 15 UG/.5ML; UG/.5ML; UG/.5ML; UG/.5ML
0.5 SUSPENSION INTRAMUSCULAR ONCE
Refills: 0 | Status: COMPLETED | OUTPATIENT
Start: 2019-09-27 | End: 2019-09-27

## 2019-09-27 RX ORDER — SODIUM CHLORIDE 9 MG/ML
1000 INJECTION, SOLUTION INTRAVENOUS
Refills: 0 | Status: DISCONTINUED | OUTPATIENT
Start: 2019-09-27 | End: 2019-09-28

## 2019-09-27 RX ORDER — SODIUM CHLORIDE 9 MG/ML
1000 INJECTION INTRAMUSCULAR; INTRAVENOUS; SUBCUTANEOUS
Refills: 0 | Status: DISCONTINUED | OUTPATIENT
Start: 2019-09-27 | End: 2019-09-28

## 2019-09-27 RX ADMIN — Medication 2 MILLIUNIT(S)/MIN: at 20:31

## 2019-09-27 RX ADMIN — SODIUM CHLORIDE 125 MILLILITER(S): 9 INJECTION, SOLUTION INTRAVENOUS at 19:08

## 2019-09-27 RX ADMIN — MORPHINE SULFATE 4 MILLIGRAM(S): 50 CAPSULE, EXTENDED RELEASE ORAL at 03:05

## 2019-09-27 RX ADMIN — SODIUM CHLORIDE 125 MILLILITER(S): 9 INJECTION, SOLUTION INTRAVENOUS at 10:00

## 2019-09-27 RX ADMIN — Medication 2 MILLIUNIT(S)/MIN: at 12:58

## 2019-09-27 RX ADMIN — MORPHINE SULFATE 4 MILLIGRAM(S): 50 CAPSULE, EXTENDED RELEASE ORAL at 03:01

## 2019-09-27 NOTE — OB PROVIDER H&P - HISTORY OF PRESENT ILLNESS
34yoF  at 40.1w presents for increasing pain with contractions and increasing contraction frequency. S/P Morphine 3a for therapeutic rest. +FM. -LOF, -VB.  EFW 3300g. GBS neg.    – PNC: Current pregnancy conceived using Clomid.  Pt of Dr. Elizabeth COFFEY Hx:  -Early Incomplete SAB/D&C @ 5wks  -Early Incomplete SAB/D&C @ 6 wks  -Chemical pregnancy.  2018-Right Ovarian ectopic pregnancy, s/p MTX.  -Chlamydia, s/p treatment  -Abn PAP, S/P LEEP  h/o PCOS (D/c'ed metformin @ 12 weeks)  Medical HX: Non-contributory  Surgical HX: LEEP (), D&C x2 (', )  Denies Etoh/Smoke/Drugs/Psy/FH  – Social: denies tobacco, alcohol, recreational drug use  – Meds: PNV   Metformin (stopped at 12 weeks)  – Allergies: NSAIDs - anaphylaxis    Vital Signs Last 24 Hrs  T(C): 36.7 (27 Sep 2019 05:59), Max: 37.2 (27 Sep 2019 05:57)  T(F): 98.1 (27 Sep 2019 05:59), Max: 98.96 (27 Sep 2019 05:57)  HR: 84 (27 Sep 2019 08:45) (68 - 97)  BP: 120/80 (27 Sep 2019 05:59) (114/75 - 129/82)  BP(mean): --  RR: 16 (27 Sep 2019 05:59) (16 - 20)  SpO2: 100% (27 Sep 2019 08:40) (87% - 100%)    Gen: in NAD, in pain with contractions  CV: RRR  Pulm: breathing comfortably on RA  Abd: gravid, nontender  Extr: moving all extremities with ease  -/mod jaky/- accels/occ late decel/variable decel  -[place] irr  -SVE 3/70/-3  – EFW: 3300g  – Sono: vertex    Assessment  34yoF  at 40.1w s/p morphine for therapeutic rest now in early labor with Cat II tracing.    Plan  –Transfer to [facility] for epidural  -LR@125cc/hr  -VDRL    D/W Dr. Trinidad/Dr. Elizabeth nichole NP

## 2019-09-27 NOTE — OB PROVIDER H&P - NS_OBGYNHISTORY_OBGYN_ALL_OB_FT
– PNC: Current pregnancy conceived using Clomid.  Pt of Dr. Johnson  – ERLINDA Hx:  2011-Early Incomplete SAB/D&C @ 5wks  2017-Early Incomplete SAB/D&C @ 6 wks  2017-Chemical pregnancy.  11/2018-Right Ovarian ectopic pregnancy, s/p MTX.  1998-Chlamydia, s/p treatment  2012-Abn PAP, S/P LEEP  h/o PCOS (D/c'ed metformin @ 12 weeks)  Medical HX: Non-contributory  Surgical HX: LEEP ('12), D&C x2 (''11, '17)  Denies Etoh/Smoke/Drugs/Psy/FH  – Social: denies tobacco, alcohol, recreational drug use  – Meds: PNV   Metformin (stopped at 12 weeks)  – Allergies: NSAIDs - anaphylaxis

## 2019-09-27 NOTE — OB PROVIDER H&P - ASSESSMENT
34yoF  at 40.1w presents for increasing pain with contractions and increasing contraction frequency. S/P Morphine 3a for therapeutic rest. +FM. -LOF, -VB.  EFW 3300g. GBS neg.    – PNC: Current pregnancy conceived using Clomid.  Pt of Dr. Elizabeth COFFEY Hx:  -Early Incomplete SAB/D&C @ 5wks  -Early Incomplete SAB/D&C @ 6 wks  -Chemical pregnancy.  2018-Right Ovarian ectopic pregnancy, s/p MTX.  -Chlamydia, s/p treatment  -Abn PAP, S/P LEEP  h/o PCOS (D/c'ed metformin @ 12 weeks)  Medical HX: Non-contributory  Surgical HX: LEEP (), D&C x2 (', )  Denies Etoh/Smoke/Drugs/Psy/FH  – Social: denies tobacco, alcohol, recreational drug use  – Meds: PNV   Metformin (stopped at 12 weeks)  – Allergies: NSAIDs - anaphylaxis    Vital Signs Last 24 Hrs  T(C): 36.7 (27 Sep 2019 05:59), Max: 37.2 (27 Sep 2019 05:57)  T(F): 98.1 (27 Sep 2019 05:59), Max: 98.96 (27 Sep 2019 05:57)  HR: 84 (27 Sep 2019 08:45) (68 - 97)  BP: 120/80 (27 Sep 2019 05:59) (114/75 - 129/82)  BP(mean): --  RR: 16 (27 Sep 2019 05:59) (16 - 20)  SpO2: 100% (27 Sep 2019 08:40) (87% - 100%)    Gen: in NAD, in pain with contractions  CV: RRR  Pulm: breathing comfortably on RA  Abd: gravid, nontender  Extr: moving all extremities with ease  -/mod jaky/- accels/occ late decel/variable decel pt repositioned with improvement in tracing  -[place] irr  -SVE 3/70/-3  – EFW: 3300g  – Sono: vertex    Assessment  34yoF  at 40.1w s/p morphine for therapeutic rest now in early labor with Cat II tracing.    Plan  -Admit pt for labor  -Cont efm/toco  –Transfer to [facility] for epidural  -LR@125cc/hr  -VDRL    D/W Dr. Trinidad/Dr. Elizabeth nichole, NP

## 2019-09-27 NOTE — OB PROVIDER TRIAGE NOTE - HISTORY OF PRESENT ILLNESS
R1 Triage Note    Subjective  HPI: 34yoF  at 40.1w presents for increasing pain with contractions and increasing contraction frequency. +FM. -LOF, -VB. Pt was seen in triage yesterday for rule out labor and was discharged home. EFW 3300g. GBS neg.    – PNC: Current pregnancy conceived using Clomid.  – OBGYN Hx:  -Early Incomplete SAB/D&C @ 5wks  -Early Incomplete SAB/D&C @ 6 wks  -Chemical pregnancy.  2018-Right Ovarian ectopic pregnancy, s/p MTX.  -Chlamydia, s/p treatment  -Abn PAP, S/P LEEP  h/o PCOS (D/c'ed metformin @ 12 weeks)  Medical HX: Non-contributory  Surgical HX: LEEP (), D&C x2 (', )  Denies Etoh/Smoke/Drugs/Psy/FH  – Social: denies tobacco, alcohol, recreational drug use  – Meds: PNV   Metformin (stopped at 12 weeks)  – Allergies: NSAIDs - anaphylaxis    Objective    VS  T(C): 36.6 (19 @ 23:38)  HR: 75 (19 @ 03:03)  BP: 129/82 (19 @ 02:53)  RR: 20 (19 @ 23:38)  SpO2: 100% (19 @ 03:03)    CV: RRR  Pulm: breathing comfortably on RA  Abd: gravid, nontender  Extr: moving all extremities with ease  – VE: 0.5/50/-3  – FHT:   – [place]:   – EFW:  – Sono: vertex  -BPP:   -HEIDI: 7    Assessment  No prenatal issues. GBS _.    Plan  –    Patient discussed with attending physician, Dr. . Robyn Frankel PGY1 R1 Triage Note    Subjective  HPI: 34yoF  at 40.1w presents for increasing pain with contractions and increasing contraction frequency. +FM. -LOF, -VB. Pt was seen in triage yesterday for rule out labor and was discharged home. EFW 3300g. GBS neg.    – PNC: Current pregnancy conceived using Clomid.  – OBGYN Hx:  -Early Incomplete SAB/D&C @ 5wks  -Early Incomplete SAB/D&C @ 6 wks  -Chemical pregnancy.  2018-Right Ovarian ectopic pregnancy, s/p MTX.  -Chlamydia, s/p treatment  -Abn PAP, S/P LEEP  h/o PCOS (D/c'ed metformin @ 12 weeks)  Medical HX: Non-contributory  Surgical HX: LEEP (), D&C x2 (', )  Denies Etoh/Smoke/Drugs/Psy/FH  – Social: denies tobacco, alcohol, recreational drug use  – Meds: PNV   Metformin (stopped at 12 weeks)  – Allergies: NSAIDs - anaphylaxis    Objective    VS  T(C): 36.6 (19 @ 23:38)  HR: 75 (19 @ 03:03)  BP: 129/82 (19 @ 02:53)  RR: 20 (19 @ 23:38)  SpO2: 100% (19 @ 03:03)  Gen: in NAD, in pain with contractions  CV: RRR  Pulm: breathing comfortably on RA  Abd: gravid, nontender  Extr: moving all extremities with ease  – VE: 0.5/50/-3  – FHT: 120 bpm, mod jaky, +accels, -decels  – [place]: cx irreg  – EFW: 3300g  – Sono: vertex  - BPP:   - HEIDI: 7    Assessment  34yoF  at 40.1w presenting to triage with painful contractions with increasing frequency. Patient does not feel she can walk any more due to the pain.     Plan  –Morphine for pain  -LR@125cc/hr  -CBC sent  -T&S sent  -will reevaluate in a few hours for cervical change    D/W  Pt seen with Shani Samuels PGY3  Robyn Frankel PGY1 R1 Triage Note    Subjective  HPI: 34yoF  at 40.1w presents for increasing pain with contractions and increasing contraction frequency. +FM. -LOF, -VB. Pt was seen in triage yesterday for rule out labor and was discharged home with instruction to continue ambulating. EFW 3300g. GBS neg.    – PNC: Current pregnancy conceived using Clomid.  Pt of Dr. Johnson  – OBGYN Hx:  -Early Incomplete SAB/D&C @ 5wks  -Early Incomplete SAB/D&C @ 6 wks  -Chemical pregnancy.  2018-Right Ovarian ectopic pregnancy, s/p MTX.  -Chlamydia, s/p treatment  -Abn PAP, S/P LEEP  h/o PCOS (D/c'ed metformin @ 12 weeks)  Medical HX: Non-contributory  Surgical HX: LEEP (), D&C x2 (', )  Denies Etoh/Smoke/Drugs/Psy/FH  – Social: denies tobacco, alcohol, recreational drug use  – Meds: PNV   Metformin (stopped at 12 weeks)  – Allergies: NSAIDs - anaphylaxis    Objective    VS  T(C): 36.6 (19 @ 23:38)  HR: 75 (19 @ 03:03)  BP: 129/82 (19 @ 02:53)  RR: 20 (19 @ 23:38)  SpO2: 100% (19 @ 03:03)  Gen: in NAD, in pain with contractions  CV: RRR  Pulm: breathing comfortably on RA  Abd: gravid, nontender  Extr: moving all extremities with ease  – VE: 0.5/50/-3  – FHT: 120 bpm, mod jaky, +accels, -decels  – [place]: cx irreg  – EFW: 3300g  – Sono: vertex  - BPP:   - HEIDI: 7    Assessment  34yoF  at 40.1w presenting to triage with painful contractions with increasing frequency. Patient does not feel she can walk any more due to the pain.     Plan  –Morphine for pain  -LR@125cc/hr  -CBC sent  -T&S sent  -will reevaluate in a few hours for cervical change    D/W Dr. Martínez  Pt seen with Shani Samuels PGY3  Robyn Frankel PGY1

## 2019-09-27 NOTE — OB PROVIDER H&P - PROBLEM SELECTOR PLAN 1
-Admit pt for labor  -Cont efm/toco  –Transfer to [facility] for epidural  -LR@125cc/hr  -VDRL    D/W Dr. Trinidad/Dr. Johnson

## 2019-09-28 ENCOUNTER — TRANSCRIPTION ENCOUNTER (OUTPATIENT)
Age: 34
End: 2019-09-28

## 2019-09-28 LAB
BASOPHILS # BLD AUTO: 0.03 K/UL — SIGNIFICANT CHANGE UP (ref 0–0.2)
BASOPHILS NFR BLD AUTO: 0.2 % — SIGNIFICANT CHANGE UP (ref 0–2)
EOSINOPHIL # BLD AUTO: 0 K/UL — SIGNIFICANT CHANGE UP (ref 0–0.5)
EOSINOPHIL NFR BLD AUTO: 0 % — SIGNIFICANT CHANGE UP (ref 0–6)
HCT VFR BLD CALC: 33.3 % — LOW (ref 34.5–45)
HGB BLD-MCNC: 11.2 G/DL — LOW (ref 11.5–15.5)
IMM GRANULOCYTES NFR BLD AUTO: 0.6 % — SIGNIFICANT CHANGE UP (ref 0–1.5)
LDH SERPL L TO P-CCNC: 155 U/L — SIGNIFICANT CHANGE UP (ref 135–225)
LYMPHOCYTES # BLD AUTO: 10.5 % — LOW (ref 13–44)
LYMPHOCYTES # BLD AUTO: 2.03 K/UL — SIGNIFICANT CHANGE UP (ref 1–3.3)
MCHC RBC-ENTMCNC: 29.1 PG — SIGNIFICANT CHANGE UP (ref 27–34)
MCHC RBC-ENTMCNC: 33.6 % — SIGNIFICANT CHANGE UP (ref 32–36)
MCV RBC AUTO: 86.5 FL — SIGNIFICANT CHANGE UP (ref 80–100)
MONOCYTES # BLD AUTO: 1.02 K/UL — HIGH (ref 0–0.9)
MONOCYTES NFR BLD AUTO: 5.3 % — SIGNIFICANT CHANGE UP (ref 2–14)
NEUTROPHILS # BLD AUTO: 16.06 K/UL — HIGH (ref 1.8–7.4)
NEUTROPHILS NFR BLD AUTO: 83.4 % — HIGH (ref 43–77)
NRBC # FLD: 0 K/UL — SIGNIFICANT CHANGE UP (ref 0–0)
PLATELET # BLD AUTO: 190 K/UL — SIGNIFICANT CHANGE UP (ref 150–400)
PMV BLD: 11.2 FL — SIGNIFICANT CHANGE UP (ref 7–13)
RBC # BLD: 3.85 M/UL — SIGNIFICANT CHANGE UP (ref 3.8–5.2)
RBC # FLD: 13.9 % — SIGNIFICANT CHANGE UP (ref 10.3–14.5)
T PALLIDUM AB TITR SER: NEGATIVE — SIGNIFICANT CHANGE UP
WBC # BLD: 19.26 K/UL — HIGH (ref 3.8–10.5)
WBC # FLD AUTO: 19.26 K/UL — HIGH (ref 3.8–10.5)

## 2019-09-28 RX ORDER — SIMETHICONE 80 MG/1
80 TABLET, CHEWABLE ORAL EVERY 4 HOURS
Refills: 0 | Status: DISCONTINUED | OUTPATIENT
Start: 2019-09-28 | End: 2019-10-01

## 2019-09-28 RX ORDER — HEPARIN SODIUM 5000 [USP'U]/ML
5000 INJECTION INTRAVENOUS; SUBCUTANEOUS EVERY 12 HOURS
Refills: 0 | Status: DISCONTINUED | OUTPATIENT
Start: 2019-09-28 | End: 2019-10-01

## 2019-09-28 RX ORDER — DOCUSATE SODIUM 100 MG
100 CAPSULE ORAL
Refills: 0 | Status: DISCONTINUED | OUTPATIENT
Start: 2019-09-28 | End: 2019-10-01

## 2019-09-28 RX ORDER — TETANUS TOXOID, REDUCED DIPHTHERIA TOXOID AND ACELLULAR PERTUSSIS VACCINE, ADSORBED 5; 2.5; 8; 8; 2.5 [IU]/.5ML; [IU]/.5ML; UG/.5ML; UG/.5ML; UG/.5ML
0.5 SUSPENSION INTRAMUSCULAR ONCE
Refills: 0 | Status: DISCONTINUED | OUTPATIENT
Start: 2019-09-28 | End: 2019-10-01

## 2019-09-28 RX ORDER — FAMOTIDINE 10 MG/ML
20 INJECTION INTRAVENOUS ONCE
Refills: 0 | Status: COMPLETED | OUTPATIENT
Start: 2019-09-28 | End: 2019-09-28

## 2019-09-28 RX ORDER — LANOLIN
1 OINTMENT (GRAM) TOPICAL EVERY 6 HOURS
Refills: 0 | Status: DISCONTINUED | OUTPATIENT
Start: 2019-09-28 | End: 2019-10-01

## 2019-09-28 RX ORDER — ACETAMINOPHEN 500 MG
975 TABLET ORAL EVERY 6 HOURS
Refills: 0 | Status: DISCONTINUED | OUTPATIENT
Start: 2019-09-29 | End: 2019-09-29

## 2019-09-28 RX ORDER — OXYCODONE HYDROCHLORIDE 5 MG/1
5 TABLET ORAL ONCE
Refills: 0 | Status: DISCONTINUED | OUTPATIENT
Start: 2019-09-28 | End: 2019-10-01

## 2019-09-28 RX ORDER — DIPHENHYDRAMINE HCL 50 MG
25 CAPSULE ORAL EVERY 6 HOURS
Refills: 0 | Status: DISCONTINUED | OUTPATIENT
Start: 2019-09-28 | End: 2019-09-29

## 2019-09-28 RX ORDER — ACETAMINOPHEN 500 MG
1000 TABLET ORAL EVERY 6 HOURS
Refills: 0 | Status: COMPLETED | OUTPATIENT
Start: 2019-09-28 | End: 2019-09-29

## 2019-09-28 RX ORDER — OXYTOCIN 10 UNIT/ML
333.33 VIAL (ML) INJECTION
Qty: 20 | Refills: 0 | Status: DISCONTINUED | OUTPATIENT
Start: 2019-09-28 | End: 2019-09-28

## 2019-09-28 RX ORDER — METOCLOPRAMIDE HCL 10 MG
10 TABLET ORAL ONCE
Refills: 0 | Status: COMPLETED | OUTPATIENT
Start: 2019-09-28 | End: 2019-09-28

## 2019-09-28 RX ORDER — SODIUM CHLORIDE 9 MG/ML
1000 INJECTION, SOLUTION INTRAVENOUS
Refills: 0 | Status: DISCONTINUED | OUTPATIENT
Start: 2019-09-28 | End: 2019-09-29

## 2019-09-28 RX ORDER — GLYCERIN ADULT
1 SUPPOSITORY, RECTAL RECTAL AT BEDTIME
Refills: 0 | Status: DISCONTINUED | OUTPATIENT
Start: 2019-09-28 | End: 2019-10-01

## 2019-09-28 RX ORDER — CITRIC ACID/SODIUM CITRATE 300-500 MG
30 SOLUTION, ORAL ORAL ONCE
Refills: 0 | Status: COMPLETED | OUTPATIENT
Start: 2019-09-28 | End: 2019-09-28

## 2019-09-28 RX ORDER — ERTAPENEM SODIUM 1 G/1
1000 INJECTION, POWDER, LYOPHILIZED, FOR SOLUTION INTRAMUSCULAR; INTRAVENOUS EVERY 24 HOURS
Refills: 0 | Status: DISCONTINUED | OUTPATIENT
Start: 2019-09-28 | End: 2019-09-29

## 2019-09-28 RX ORDER — ACETAMINOPHEN 500 MG
975 TABLET ORAL EVERY 6 HOURS
Refills: 0 | Status: COMPLETED | OUTPATIENT
Start: 2019-09-28 | End: 2020-08-26

## 2019-09-28 RX ORDER — MAGNESIUM HYDROXIDE 400 MG/1
30 TABLET, CHEWABLE ORAL
Refills: 0 | Status: DISCONTINUED | OUTPATIENT
Start: 2019-09-28 | End: 2019-10-01

## 2019-09-28 RX ORDER — OXYCODONE HYDROCHLORIDE 5 MG/1
5 TABLET ORAL
Refills: 0 | Status: DISCONTINUED | OUTPATIENT
Start: 2019-09-28 | End: 2019-10-01

## 2019-09-28 RX ADMIN — Medication 10 MILLIGRAM(S): at 03:08

## 2019-09-28 RX ADMIN — ERTAPENEM SODIUM 120 MILLIGRAM(S): 1 INJECTION, POWDER, LYOPHILIZED, FOR SOLUTION INTRAMUSCULAR; INTRAVENOUS at 05:19

## 2019-09-28 RX ADMIN — Medication 1000 MILLIGRAM(S): at 08:05

## 2019-09-28 RX ADMIN — FAMOTIDINE 20 MILLIGRAM(S): 10 INJECTION INTRAVENOUS at 03:08

## 2019-09-28 RX ADMIN — Medication 25 MILLIGRAM(S): at 11:07

## 2019-09-28 RX ADMIN — Medication 400 MILLIGRAM(S): at 14:00

## 2019-09-28 RX ADMIN — HEPARIN SODIUM 5000 UNIT(S): 5000 INJECTION INTRAVENOUS; SUBCUTANEOUS at 11:07

## 2019-09-28 RX ADMIN — Medication 1000 MILLIUNIT(S)/MIN: at 07:11

## 2019-09-28 RX ADMIN — Medication 30 MILLILITER(S): at 03:08

## 2019-09-28 RX ADMIN — HEPARIN SODIUM 5000 UNIT(S): 5000 INJECTION INTRAVENOUS; SUBCUTANEOUS at 23:48

## 2019-09-28 RX ADMIN — Medication 1000 MILLIGRAM(S): at 14:30

## 2019-09-28 RX ADMIN — Medication 400 MILLIGRAM(S): at 07:45

## 2019-09-28 RX ADMIN — SODIUM CHLORIDE 75 MILLILITER(S): 9 INJECTION, SOLUTION INTRAVENOUS at 07:36

## 2019-09-28 RX ADMIN — Medication 25 MILLIGRAM(S): at 22:50

## 2019-09-28 NOTE — DISCHARGE NOTE OB - CARE PROVIDER_API CALL
Elias Johnson)  MaternalFetal Medicine; Obstetrics and Gynecology  [address] [address] [place] [address], Room [address]  [place] [address]  Phone: [phone]  Fax: [phone]  Follow Up Time:

## 2019-09-28 NOTE — CHART NOTE - NSCHARTNOTEFT_GEN_A_CORE
Pt complains of contractions  categ II tracing - called for 7 minute fetal heart rate deceleration with tetanic contractions  Pitocin d/c'd  ve 4/70/-2 - cervix thick  will await recovery and then place IUPC and ISE
Pt with antoher fetal heart rate decel after frequent contractions - had been categ I prior  Pitocin d/c'd  ve 5/80/-2  restart pitocin at lower rate after recovery
NP note    Pt seen for evaluation sp epidural. Pt comfortable.     Vital Signs Last 24 Hrs  T(C): 36.7 (27 Sep 2019 05:59), Max: 37.2 (27 Sep 2019 05:57)  T(F): 98.1 (27 Sep 2019 05:59), Max: 98.96 (27 Sep 2019 05:57)  HR: 79 (27 Sep 2019 11:46) (68 - 105)  BP: 128/85 (27 Sep 2019 11:46) (106/58 - 133/89)  BP(mean): --  RR: 16 (27 Sep 2019 05:59) (16 - 20)  SpO2: 99% (27 Sep 2019 11:44) (87% - 100%)    EFM Cat I   Ctx irr  SVE 3-4/80/-3    -Consider pitocin  -Left message for Dr. Vivi nichole, NP
PGY1    *Backlogged due to clinical duties*  7:45 pm   Patient seen at bedside for IUPC placement and ISE for Category 2 tracing. Comfortable with epidural, Pitocin turned off.     VS  T(C): 37.1 (09-27-19 @ 19:35)  HR: 89 (09-27-19 @ 20:04)  BP: 115/73 (09-27-19 @ 19:55)  RR: 16 (09-27-19 @ 17:48)  SpO2: 100% (09-27-19 @ 20:09)    SVE: 4/70/-3  FHR: 140/mod jaky/-acel/-decel: cat 1  Fuig: ctx every 3 mins    8:12 pm Tracing improved; will restart Pitocin.  D/w Dr. Brayan Hernandez PGY1
Pt comfortable  NST had been categ 1  examined 6/80/-2  after exam, pt changed position and had 8 minute fetal heart rate deceleration  Pitocin again d/c'd  will await recovery

## 2019-09-28 NOTE — DISCHARGE NOTE OB - PATIENT PORTAL LINK FT
You can access the FollowMyHealth Patient Portal offered by [facility] by registering at the following website: [email or web address]. By joining [facility]’s FollowMyHealth portal, you will also be able to view your health information using other applications (apps) compatible with our system.

## 2019-09-28 NOTE — DISCHARGE NOTE OB - CARE PLAN
Principal Discharge DX:	 delivery delivered  Goal:	recovery  Assessment and plan of treatment:	no heavy lifting or exercise  follow up in 2 weeks

## 2019-09-28 NOTE — BRIEF OPERATIVE NOTE - NSICDXBRIEFPREOP_GEN_ALL_CORE_FT
PRE-OP DIAGNOSIS:  Abnormal fetal heart rate affecting pregnancy 28-Sep-2019 08:33:55  Precious Covarrubias

## 2019-09-28 NOTE — OB NEONATOLOGY/PEDIATRICIAN DELIVERY SUMMARY - NSPEDSNEONOTESA_OBGYN_ALL_OB_FT
Baby is a 40.2 wk GA male born to a 35 y/o  mother via C/S for category 2 tracings. Maternal history significant for previous 3 SABs and 2 DNCs, PCOS on metforming stopped at 12 weeks gestation. This pregnancy is a clomid pregnancy. Prenatal history uncomplicated. Maternal blood type B pos. Prenatal labs negative, non-reactive, and immune. GBS negative on . SROM at 1438H on , clear fluids. Baby born vigorous and crying spontaneously. Warmed, dried, stimulated. Apgars 99. EOS 2.63 1 hour postpartum. Mom plans to breastfeed, would like hepB and circ.

## 2019-09-28 NOTE — BRIEF OPERATIVE NOTE - NSICDXBRIEFPOSTOP_GEN_ALL_CORE_FT
POST-OP DIAGNOSIS:  Abnormal fetal heart rate affecting pregnancy 28-Sep-2019 08:33:58  Precious Covarrubias

## 2019-09-28 NOTE — BRIEF OPERATIVE NOTE - OPERATION/FINDINGS
primary LTCS for category 2 tracing, uncomplicated  viable male infant, vertex presentation, Apgars 9/9, cord gasses sent  uterus with small left anterior fundal subserosal myoma, grossly normal fallopian tubes and ovaries

## 2019-09-28 NOTE — DISCHARGE NOTE OB - HOSPITAL COURSE
Pt admitted in labor. Primary low flap transverse  section 19 - viable male infant. Chorioamnionitis

## 2019-09-28 NOTE — OB RN DELIVERY SUMMARY - NS_SEPSISRSKCALC_OBGYN_ALL_OB_FT
EOS calculated successfully. EOS Risk Factor: 0.5/1000 live births ([facility] national incidence); GA=40w2d; Temp=99.5; ROM=13.133; GBS='Negative'; Antibiotics='No antibiotics or any antibiotics < 2 hrs prior to birth'

## 2019-09-28 NOTE — DISCHARGE NOTE OB - CARE PROVIDERS DIRECT ADDRESSES
[email or web address]

## 2019-09-28 NOTE — OB RN DELIVERY SUMMARY - NSCORDSECONDSA_OBGYN_A_OB_FT
I had to reschedule the patient from 6/3 to 6/24 due to Dr. Carlo Lemons being out. She will need her Percocet prescription before then.
Yes, print another 1 month Rx and I'll sign.
60

## 2019-09-29 LAB
BASOPHILS # BLD AUTO: 0.03 K/UL — SIGNIFICANT CHANGE UP (ref 0–0.2)
BASOPHILS NFR BLD AUTO: 0.2 % — SIGNIFICANT CHANGE UP (ref 0–2)
EOSINOPHIL # BLD AUTO: 0.04 K/UL — SIGNIFICANT CHANGE UP (ref 0–0.5)
EOSINOPHIL NFR BLD AUTO: 0.2 % — SIGNIFICANT CHANGE UP (ref 0–6)
HCT VFR BLD CALC: 34.8 % — SIGNIFICANT CHANGE UP (ref 34.5–45)
HGB BLD-MCNC: 11.6 G/DL — SIGNIFICANT CHANGE UP (ref 11.5–15.5)
IMM GRANULOCYTES NFR BLD AUTO: 0.7 % — SIGNIFICANT CHANGE UP (ref 0–1.5)
LYMPHOCYTES # BLD AUTO: 12.6 % — LOW (ref 13–44)
LYMPHOCYTES # BLD AUTO: 2.21 K/UL — SIGNIFICANT CHANGE UP (ref 1–3.3)
MCHC RBC-ENTMCNC: 29.3 PG — SIGNIFICANT CHANGE UP (ref 27–34)
MCHC RBC-ENTMCNC: 33.3 % — SIGNIFICANT CHANGE UP (ref 32–36)
MCV RBC AUTO: 87.9 FL — SIGNIFICANT CHANGE UP (ref 80–100)
MONOCYTES # BLD AUTO: 1.32 K/UL — HIGH (ref 0–0.9)
MONOCYTES NFR BLD AUTO: 7.5 % — SIGNIFICANT CHANGE UP (ref 2–14)
NEUTROPHILS # BLD AUTO: 13.85 K/UL — HIGH (ref 1.8–7.4)
NEUTROPHILS NFR BLD AUTO: 78.8 % — HIGH (ref 43–77)
NRBC # FLD: 0 K/UL — SIGNIFICANT CHANGE UP (ref 0–0)
PLATELET # BLD AUTO: 206 K/UL — SIGNIFICANT CHANGE UP (ref 150–400)
PMV BLD: 12.4 FL — SIGNIFICANT CHANGE UP (ref 7–13)
RBC # BLD: 3.96 M/UL — SIGNIFICANT CHANGE UP (ref 3.8–5.2)
RBC # FLD: 14.2 % — SIGNIFICANT CHANGE UP (ref 10.3–14.5)
WBC # BLD: 17.58 K/UL — HIGH (ref 3.8–10.5)
WBC # FLD AUTO: 17.58 K/UL — HIGH (ref 3.8–10.5)

## 2019-09-29 RX ORDER — DIPHENHYDRAMINE HCL 50 MG
50 CAPSULE ORAL EVERY 6 HOURS
Refills: 0 | Status: DISCONTINUED | OUTPATIENT
Start: 2019-09-29 | End: 2019-10-01

## 2019-09-29 RX ORDER — ACETAMINOPHEN 500 MG
975 TABLET ORAL EVERY 6 HOURS
Refills: 0 | Status: DISCONTINUED | OUTPATIENT
Start: 2019-09-29 | End: 2019-10-01

## 2019-09-29 RX ORDER — DIPHENHYDRAMINE HCL 50 MG
25 CAPSULE ORAL EVERY 4 HOURS
Refills: 0 | Status: DISCONTINUED | OUTPATIENT
Start: 2019-09-29 | End: 2019-09-29

## 2019-09-29 RX ADMIN — Medication 975 MILLIGRAM(S): at 10:18

## 2019-09-29 RX ADMIN — Medication 50 MILLIGRAM(S): at 22:11

## 2019-09-29 RX ADMIN — HEPARIN SODIUM 5000 UNIT(S): 5000 INJECTION INTRAVENOUS; SUBCUTANEOUS at 12:19

## 2019-09-29 RX ADMIN — ERTAPENEM SODIUM 120 MILLIGRAM(S): 1 INJECTION, POWDER, LYOPHILIZED, FOR SOLUTION INTRAMUSCULAR; INTRAVENOUS at 04:46

## 2019-09-29 RX ADMIN — Medication 975 MILLIGRAM(S): at 16:16

## 2019-09-29 RX ADMIN — SIMETHICONE 80 MILLIGRAM(S): 80 TABLET, CHEWABLE ORAL at 22:11

## 2019-09-29 RX ADMIN — Medication 975 MILLIGRAM(S): at 05:00

## 2019-09-29 RX ADMIN — Medication 975 MILLIGRAM(S): at 10:50

## 2019-09-29 RX ADMIN — Medication 25 MILLIGRAM(S): at 17:50

## 2019-09-29 RX ADMIN — Medication 975 MILLIGRAM(S): at 04:01

## 2019-09-29 RX ADMIN — Medication 975 MILLIGRAM(S): at 22:45

## 2019-09-29 RX ADMIN — Medication 975 MILLIGRAM(S): at 22:12

## 2019-09-30 RX ORDER — ACETAMINOPHEN 500 MG
3 TABLET ORAL
Qty: 0 | Refills: 0 | DISCHARGE
Start: 2019-09-30

## 2019-09-30 RX ORDER — FERROUS SULFATE 325(65) MG
325 TABLET ORAL DAILY
Refills: 0 | Status: DISCONTINUED | OUTPATIENT
Start: 2019-09-30 | End: 2019-10-01

## 2019-09-30 RX ADMIN — Medication 1 TABLET(S): at 11:38

## 2019-09-30 RX ADMIN — Medication 975 MILLIGRAM(S): at 11:38

## 2019-09-30 RX ADMIN — MAGNESIUM HYDROXIDE 30 MILLILITER(S): 400 TABLET, CHEWABLE ORAL at 05:57

## 2019-09-30 RX ADMIN — Medication 975 MILLIGRAM(S): at 06:30

## 2019-09-30 RX ADMIN — Medication 975 MILLIGRAM(S): at 23:42

## 2019-09-30 RX ADMIN — Medication 975 MILLIGRAM(S): at 05:57

## 2019-09-30 RX ADMIN — Medication 975 MILLIGRAM(S): at 17:37

## 2019-09-30 RX ADMIN — HEPARIN SODIUM 5000 UNIT(S): 5000 INJECTION INTRAVENOUS; SUBCUTANEOUS at 11:38

## 2019-09-30 RX ADMIN — Medication 975 MILLIGRAM(S): at 18:15

## 2019-09-30 RX ADMIN — SIMETHICONE 80 MILLIGRAM(S): 80 TABLET, CHEWABLE ORAL at 23:44

## 2019-09-30 RX ADMIN — Medication 975 MILLIGRAM(S): at 12:18

## 2019-09-30 RX ADMIN — HEPARIN SODIUM 5000 UNIT(S): 5000 INJECTION INTRAVENOUS; SUBCUTANEOUS at 00:10

## 2019-09-30 RX ADMIN — Medication 100 MILLIGRAM(S): at 13:55

## 2019-09-30 RX ADMIN — HEPARIN SODIUM 5000 UNIT(S): 5000 INJECTION INTRAVENOUS; SUBCUTANEOUS at 23:42

## 2019-09-30 RX ADMIN — Medication 325 MILLIGRAM(S): at 11:38

## 2019-09-30 NOTE — PROGRESS NOTE ADULT - ATTENDING COMMENTS
OB Attending    Patient seen and examined at bedside. Agree with above resident note. Incision c/d/i with steri strips. Baby with bilateral undescended testes, no circ. D/C planning for POD#3.    VICKI Pedro-MD Angel

## 2019-09-30 NOTE — PROGRESS NOTE ADULT - ASSESSMENT
A/P: 35yo POD#1 s/p LTCS.  Patient is stable and doing well post-operatively.
A/P: 33yo POD#2 s/p LTCS.  Patient is stable and doing well post-operatively.

## 2019-09-30 NOTE — PROGRESS NOTE ADULT - PROBLEM SELECTOR PLAN 1
- Continue regular diet.  - Increase ambulation.  - Continue tylenol, oxycodone PRN for pain control.
- Continue regular diet.  - Increase ambulation.  - Continue motrin, tylenol, oxycodone PRN for pain control.    - F/u AM CBC  Tejal PGY1

## 2019-10-01 VITALS
SYSTOLIC BLOOD PRESSURE: 121 MMHG | HEART RATE: 92 BPM | RESPIRATION RATE: 18 BRPM | DIASTOLIC BLOOD PRESSURE: 78 MMHG | OXYGEN SATURATION: 99 % | TEMPERATURE: 98 F

## 2019-10-01 RX ADMIN — SIMETHICONE 80 MILLIGRAM(S): 80 TABLET, CHEWABLE ORAL at 05:30

## 2019-10-01 RX ADMIN — Medication 975 MILLIGRAM(S): at 05:30

## 2019-10-01 RX ADMIN — Medication 975 MILLIGRAM(S): at 00:30

## 2019-10-01 NOTE — PROGRESS NOTE ADULT - SUBJECTIVE AND OBJECTIVE BOX
OB Progress Note: LTCS, POD#2    S: 35yo POD#2 s/p LTCS c/b PPT on POD#0 treated with Invanz. Pain is being controlled with just tylenol due to allergy to NSAIDs.  She will continue with tylenol and see how well pain is tolerated today. She is tolerating a regular diet and passing flatus. She is voiding spontaneously, and ambulating without difficulty. Denies CP/SOB. Denies lightheadedness/dizziness. Denies N/V.    O:  Vitals:  Vital Signs Last 24 Hrs  T(C): 37.2 (30 Sep 2019 06:17), Max: 37.2 (30 Sep 2019 06:17)  T(F): 99 (30 Sep 2019 06:17), Max: 99 (30 Sep 2019 06:17)  HR: 98 (30 Sep 2019 06:17) (95 - 105)  BP: 127/77 (30 Sep 2019 06:17) (108/73 - 127/77)  BP(mean): --  RR: 18 (30 Sep 2019 06:17) (16 - 18)  SpO2: 97% (30 Sep 2019 06:17) (96% - 98%)    MEDICATIONS  (STANDING):  acetaminophen   Tablet .. 975 milliGRAM(s) Oral every 6 hours  diphtheria/tetanus/pertussis (acellular) Vaccine (ADAcel) 0.5 milliLiter(s) IntraMuscular once  ferrous    sulfate 325 milliGRAM(s) Oral daily  heparin  Injectable 5000 Unit(s) SubCutaneous every 12 hours  prenatal multivitamin 1 Tablet(s) Oral daily      MEDICATIONS  (PRN):  diphenhydrAMINE 50 milliGRAM(s) Oral every 6 hours PRN Rash and/or Itching  docusate sodium 100 milliGRAM(s) Oral two times a day PRN Stool softening  glycerin Suppository - Adult 1 Suppository(s) Rectal at bedtime PRN Constipation  lanolin Ointment 1 Application(s) Topical every 6 hours PRN Sore Nipples  magnesium hydroxide Suspension 30 milliLiter(s) Oral two times a day PRN Constipation  oxyCODONE    IR 5 milliGRAM(s) Oral every 3 hours PRN Moderate to Severe Pain (4-10)  oxyCODONE    IR 5 milliGRAM(s) Oral once PRN Moderate to Severe Pain (4-10)  simethicone 80 milliGRAM(s) Chew every 4 hours PRN Gas      Labs:  Blood type: B Positive  Rubella IgG: RPR: Negative                          11.6   17.58<H> >-----------< 206    ( 09-29 @ 06:15 )             34.8                        11.2<L>   19.26<H> >-----------< 190    ( 09-28 @ 12:34 )             33.3<L>                  PE:  General: NAD  Abdomen: Soft, appropriately tender, incision c/d/i.  Extremities: No erythema, no pitting edema        Ruiz Avalos PGY1
Postop Day  __1_ s/p   C- Section    THERAPY:  [  ] Spinal morphine   [ x ] Epidural morphine   [  ] IV PCA Hydromorphone 1 mg/ml      Sedation Score:	  [ x ] Alert	    [  ] Drowsy        [  ] Arousable	[  ] Asleep	[  ] Unresponsive    Side Effects:	  [ x ] None	     [  ] Nausea        [  ] Pruritus        [  ] Weakness   [  ] Numbness        ASSESSMENT/ PLAN   [   ] Discontinue         [  ] Continue  [ x ]Documentation and Verification of current medications     Comments:       Patient is doing well.  OOBAA. Tolerating clears.  Pain is tolerable.  No residual anesthetic issues or complications noted.
S: Patient doing well. No complaints. Minimal lochia. Pain controlled.    O: Vital Signs Last 24 Hrs  T(C): 36.6 (29 Sep 2019 06:04), Max: 37.1 (28 Sep 2019 14:52)  T(F): 97.8 (29 Sep 2019 06:04), Max: 98.7 (28 Sep 2019 14:52)  HR: 60 (29 Sep 2019 06:04) (60 - 102)  BP: 100/61 (29 Sep 2019 06:04) (100/61 - 130/86)  BP(mean): --  RR: 18 (29 Sep 2019 06:04) (17 - 19)  SpO2: 98% (29 Sep 2019 06:04) (97% - 99%)    Gen: NAD  Abd: soft, Nontender, Nondistended, fundus firm  Ext: no tendern, mild edema    Labs:                        11.6   17.58 )-----------( 206      ( 29 Sep 2019 06:15 )             34.8       A: 34y PPD#2  s/p c/s doing well.    Plan:  Routine postpartum care  Encouraged out of bed  Regular diet
SUBJECTIVE:    Pain: well controlled  Complaints: none    MILESTONES:  Alert and oriented x 3  [ x ]  Out of bed/ ambulating. [ x ]  Flatus: [ x ]  Postive [  ] Negative   Bowel movement  [ x ] Positive [  ] Negative   Voiding [x  ] Due to void [  ]   Diet: Regular [x  ]  Clears [  ]  NPO [  ]  Infant feeding:  Breast [x  ]   Bottle [  ]  Both [  ]  Feeding related inssues and/or concerns: none      OBJECTIVE:  T(C): 36.5 (10-01-19 @ 05:35), Max: 36.7 (19 @ 14:45)  HR: 92 (10-01-19 @ 05:35) (92 - 98)  BP: 121/78 (10-01-19 @ 05:35) (121/75 - 126/76)  RR: 18 (10-01-19 @ 05:35) (17 - 18)  SpO2: 99% (10-01-19 @ 05:35) (99% - 100%)  Wt(kg): --    MEDICATIONS  (STANDING):  acetaminophen   Tablet .. 975 milliGRAM(s) Oral every 6 hours  diphtheria/tetanus/pertussis (acellular) Vaccine (ADAcel) 0.5 milliLiter(s) IntraMuscular once  ferrous    sulfate 325 milliGRAM(s) Oral daily  heparin  Injectable 5000 Unit(s) SubCutaneous every 12 hours  prenatal multivitamin 1 Tablet(s) Oral daily    MEDICATIONS  (PRN):  diphenhydrAMINE 50 milliGRAM(s) Oral every 6 hours PRN Rash and/or Itching  docusate sodium 100 milliGRAM(s) Oral two times a day PRN Stool softening  glycerin Suppository - Adult 1 Suppository(s) Rectal at bedtime PRN Constipation  lanolin Ointment 1 Application(s) Topical every 6 hours PRN Sore Nipples  magnesium hydroxide Suspension 30 milliLiter(s) Oral two times a day PRN Constipation  oxyCODONE    IR 5 milliGRAM(s) Oral every 3 hours PRN Moderate to Severe Pain (4-10)  oxyCODONE    IR 5 milliGRAM(s) Oral once PRN Moderate to Severe Pain (4-10)  simethicone 80 milliGRAM(s) Chew every 4 hours PRN Gas        ASSESSMENT:  34y  y/o G  5 P 1   PO Day#   3     Delivery: Primary [ x ]    Repeat [  ]                                       Indication of procedure: Abnormal FHR  Condition: Stable  Past Medical History significant for: HPI:  ((((((34yoF  at 40.1w   – PNC: Current pregnancy conceived using Clomid.  Pt of Dr. Johnson  – ERLINDA Hx:  -Early Incomplete SAB/D&C @ 5wks  -Early Incomplete SAB/D&C @ 6 wks  -Chemical pregnancy.  2018-Right Ovarian ectopic pregnancy, s/p MTX.  -Chlamydia, s/p treatment  -Abn PAP, S/P LEEP  h/o PCOS (D/c'ed metformin @ 12 weeks)  Medical HX: Non-contributory  Surgical HX: LEEP (), D&C x2 (', )  Denies Etoh/Smoke/Drugs/Psy/FH  – Social: denies tobacco, alcohol, recreational drug use  – Meds: PNV   Metformin (stopped at 12 weeks)  – Allergies: NSAIDs - anaphylaxis    Vital Signs Last 24 Hrs  T(C): 36.7 (27 Sep 2019 05:59), Max: 37.2 (27 Sep 2019 05:57)  T(F): 98.1 (27 Sep 2019 05:59), Max: 98.96 (27 Sep 2019 05:57)  HR: 84 (27 Sep 2019 08:45) (68 - 97)  BP: 120/80 (27 Sep 2019 05:59) (114/75 - 129/82)  BP(mean): --  RR: 16 (27 Sep 2019 05:59) (16 - 20)  SpO2: 100% (27 Sep 2019 08:40) (87% - 100%)    Gen: in NAD, in pain with contractions  CV: RRR  Pulm: breathing comfortably on RA  Abd: gravid, nontender  Extr: moving all extremities with ease  -/mod jaky/- accels/occ late decel/variable decel  -[place] irr  -SVE 3/70/-3  – EFW: 3300g  – Sono: vertex    Assessment  34yoF  at 40.1w s/p morphine for therapeutic rest now in early labor with Cat II tracing.    Plan  –Transfer to R for epidural  -LR@125cc/hr  -VDRL    D/W Dr. Trinidad/Dr. Elizabeth nichole, NP (27 Sep 2019 08:45) ))))))    Current Issues: none  Heart:      RRR                        Lungs: clear  Breasts:  Soft [x  ]   Engorged [  ]  Abdomen: Soft [ x ] , distended [  ] nontender [  ]   Bowel sounds :  Present [x  ]  Absent [  ]   Fundus firm [ x ]  Boggy [  ]  Abdominal incision: Clean, dry and intact [ x ]  Staples [  ] Steri Strips [x  ] Dermabond [  ] Sutures   Patient wearing abdominal binder for support.  Vaginal: Lochia:  Heavy [  ]  Moderate [  ]   Scant [  ]  Extremities: Edema [0  ] negative Keli's Sign [x  ] Nontender Edwin  [ xx ] Positive pedal pulse [x  ]  Other relevant physical exam findings: none      PLAN:  Plan: Increase ambulation, analgesia PRN and pain medication protocol standing oxycodone, ibuprofen and acetaminophen.  Diet: Regular diet  Continue routine post-operative and postpartum care.   Discharge Planning [ x ]
OB Progress Note:  Delivery, POD#1    S: 35yo POD#1 s/p LTCS . Her pain is well controlled. She is tolerating a regular diet and passing flatus. Denies N/V. Denies CP/SOB/lightheadedness/dizziness.   She is ambulating without difficulty.   Voiding spontaneously.     Pt followed for post partum temperature. Pt afebrile since abx.     O:   Vital Signs Last 24 Hrs  T(C): 36.6 (29 Sep 2019 06:04), Max: 37.1 (28 Sep 2019 14:52)  T(F): 97.8 (29 Sep 2019 06:04), Max: 98.7 (28 Sep 2019 14:52)  HR: 60 (29 Sep 2019 06:04) (60 - 102)  BP: 100/61 (29 Sep 2019 06:04) (100/61 - 130/86)  BP(mean): --  RR: 18 (29 Sep 2019 06:04) (17 - 19)  SpO2: 98% (29 Sep 2019 06:04) (97% - 99%)    Labs:  Blood type: B Positive  Rubella IgG: RPR: Negative                          11.6   17.58<H> >-----------< 206    (  @ 06:15 )             34.8                        11.2<L>   19.26<H> >-----------< 190    (  @ 12:34 )             33.3<L>                        13.9   7.35 >-----------< 229    (  @ 03:00 )             40.3                  PE:  General: NAD  Abdomen: Mildly distended, appropriately tender, incision c/d/i.  Extremities: No erythema, no pitting edema

## 2019-10-10 ENCOUNTER — APPOINTMENT (OUTPATIENT)
Dept: OBGYN | Facility: CLINIC | Age: 34
End: 2019-10-10

## 2019-11-09 ENCOUNTER — APPOINTMENT (OUTPATIENT)
Dept: OBGYN | Facility: CLINIC | Age: 34
End: 2019-11-09
Payer: COMMERCIAL

## 2019-11-09 PROCEDURE — 0503F POSTPARTUM CARE VISIT: CPT

## 2020-07-29 NOTE — OB PROVIDER IHI INDUCTION/AUGMENTATION NOTE - NS_OBIHITYPE_OBGYN_ALL_OB
Called [facility] and advised that pt had not been seen since March and that no script had been written for DM shoes    Augmentation

## 2021-04-07 NOTE — OB NEONATOLOGY/PEDIATRICIAN DELIVERY SUMMARY - BABY A: APGAR 1 MIN REFLEX IRRITABILITY, DELIVERY
Arterial Line Insertion    Date/Time: 4/7/2021 12:53 PM  Performed by: Chris Chan CRNA  Authorized by: Chris Chan CRNA   Consent: Verbal consent obtained  Written consent obtained  Risks and benefits: risks, benefits and alternatives were discussed  Consent given by: patient  Patient understanding: patient states understanding of the procedure being performed  Patient consent: the patient's understanding of the procedure matches consent given  Procedure consent: procedure consent matches procedure scheduled  Patient identity confirmed: verbally with patient and arm band  Preparation: Patient was prepped and draped in the usual sterile fashion  Indications: hemodynamic monitoring  Orientation:  Right  Location: radial arterylidocaine (PF) (XYLOCAINE-MPF) 1 % infiltration, 0 5 mL  Procedure Details:  Needle gauge: 20  Seldinger technique: Seldinger technique used  Number of attempts: 2    Post-procedure:  Post-procedure: dressing applied  Waveform: good waveform  Post-procedure CNS: normal and unchanged  Patient tolerance: Patient tolerated the procedure well with no immediate complications  Comments:  Inserted by Dr Hali Robison
(2) cough or sneeze

## 2021-07-23 NOTE — OB PROVIDER TRIAGE NOTE - NS_FETALMONCTXPAT_OBGYN_ALL_OB
[facility] Sched: [phone] - call if you don't hear from ENT  [facility] Sched: [phone] - call if you don't hear about setting up the lung tests (PFTs)  [facility]/OT/Speech: [phone] - vinny if you don't hear about physical therapy    Please call [phone] to set up an appointment downstairs to get your labs drawn.            
Irregular Contractions

## 2023-03-01 NOTE — OB NEONATOLOGY/PEDIATRICIAN DELIVERY SUMMARY - BABY A: APGAR 5 MIN RESP RATE, DELIVERY
(2) good, crying Skyrizi Counseling: I discussed with the patient the risks of risankizumab-rzaa including but not limited to immunosuppression, and serious infections.  The patient understands that monitoring is required including a PPD at baseline and must alert us or the primary physician if symptoms of infection or other concerning signs are noted.

## 2023-09-18 NOTE — OB RN PATIENT PROFILE - NS PRO TDAP PRECAUTIONS
no precautions noted Topical Ketoconazole Counseling: Patient counseled that this medication may cause skin irritation or allergic reactions.  In the event of skin irritation, the patient was advised to reduce the amount of the drug applied or use it less frequently.   The patient verbalized understanding of the proper use and possible adverse effects of ketoconazole.  All of the patient's questions and concerns were addressed.

## 2023-12-05 NOTE — OB RN TRIAGE NOTE - NS_DISPOSITION_OBGYN_ALL_OB
Continue to Observe Discharge Pt walked into ER c/o numbness to right arm started appx 0100 tonight. Pt reports she though it was the way she was laying/sleeping on it. BEFAST negative, denies dizziness, visual issues, nv, weakness, ambulating w/o issue, or further associated.

## 2024-02-26 RX ORDER — FOLIC ACID 0.8 MG
0 TABLET ORAL
Qty: 0 | Refills: 0 | DISCHARGE

## 2024-07-25 NOTE — ED PROVIDER NOTE - NS_EDPROVIDERDISPOUSERTYPE_ED_A_ED
Alert and oriented, no focal deficits, no motor or sensory deficits. Attending Attestation (For Attendings USE Only)...

## 2024-10-05 NOTE — OB PROVIDER DELIVERY SUMMARY - NSCSDELIVERYA_OBGYN_ALL_OB
Bob Rendon,    It has been my absolute pleasure to serve you while in the emergency department at [facility] today.  Please do remember to take all medications as prescribed.  Please make sure you follow-up with your PCP within 7 days.  Please follow-up with any and all specialists as we discussed. Return for staple removal in 7 days.  Please return to the ER in case of any worsening of symptoms.  I wish you a speedy recovery!    Sincerely,    Dr. Luis Carroll MD.     Unscheduled Primary/Unscheduled
